# Patient Record
Sex: MALE | Race: WHITE | Employment: UNEMPLOYED | URBAN - METROPOLITAN AREA
[De-identification: names, ages, dates, MRNs, and addresses within clinical notes are randomized per-mention and may not be internally consistent; named-entity substitution may affect disease eponyms.]

---

## 2010-01-29 LAB — COLONOSCOPY, EXTERNAL: NORMAL

## 2011-04-01 LAB — COLONOSCOPY, EXTERNAL: NORMAL

## 2017-03-15 RX ORDER — LISINOPRIL 10 MG/1
TABLET ORAL
Qty: 30 TAB | Refills: 0 | Status: SHIPPED | OUTPATIENT
Start: 2017-03-15 | End: 2017-05-12 | Stop reason: SDUPTHER

## 2017-03-15 RX ORDER — AMLODIPINE BESYLATE 10 MG/1
TABLET ORAL
Qty: 30 TAB | Refills: 0 | Status: SHIPPED | OUTPATIENT
Start: 2017-03-15 | End: 2017-05-12 | Stop reason: SDUPTHER

## 2017-03-15 RX ORDER — SITAGLIPTIN AND METFORMIN HYDROCHLORIDE 50; 1000 MG/1; MG/1
TABLET, FILM COATED, EXTENDED RELEASE ORAL
Qty: 60 TAB | Refills: 0 | Status: SHIPPED | OUTPATIENT
Start: 2017-03-15 | End: 2017-05-12 | Stop reason: SDUPTHER

## 2017-03-15 RX ORDER — ATORVASTATIN CALCIUM 40 MG/1
TABLET, FILM COATED ORAL
Qty: 30 TAB | Refills: 0 | Status: SHIPPED | OUTPATIENT
Start: 2017-03-15 | End: 2017-05-12 | Stop reason: SDUPTHER

## 2017-03-15 RX ORDER — LEVETIRACETAM 500 MG/1
TABLET ORAL
Qty: 30 TAB | Refills: 0 | Status: SHIPPED | OUTPATIENT
Start: 2017-03-15 | End: 2017-05-12 | Stop reason: SDUPTHER

## 2017-05-12 RX ORDER — SITAGLIPTIN AND METFORMIN HYDROCHLORIDE 50; 1000 MG/1; MG/1
TABLET, FILM COATED, EXTENDED RELEASE ORAL
Qty: 60 TAB | Refills: 1 | Status: SHIPPED | OUTPATIENT
Start: 2017-05-12 | End: 2017-08-09 | Stop reason: SDUPTHER

## 2017-05-12 RX ORDER — ATORVASTATIN CALCIUM 40 MG/1
TABLET, FILM COATED ORAL
Qty: 30 TAB | Refills: 1 | Status: SHIPPED | OUTPATIENT
Start: 2017-05-12 | End: 2017-08-09 | Stop reason: SDUPTHER

## 2017-05-12 RX ORDER — AMLODIPINE BESYLATE 10 MG/1
TABLET ORAL
Qty: 30 TAB | Refills: 1 | Status: SHIPPED | OUTPATIENT
Start: 2017-05-12 | End: 2017-08-09 | Stop reason: SDUPTHER

## 2017-05-12 RX ORDER — LEVETIRACETAM 500 MG/1
TABLET ORAL
Qty: 30 TAB | Refills: 1 | Status: SHIPPED | OUTPATIENT
Start: 2017-05-12 | End: 2017-08-09 | Stop reason: SDUPTHER

## 2017-05-12 RX ORDER — LISINOPRIL 10 MG/1
TABLET ORAL
Qty: 30 TAB | Refills: 1 | Status: SHIPPED | OUTPATIENT
Start: 2017-05-12 | End: 2017-08-09 | Stop reason: SDUPTHER

## 2017-05-12 NOTE — TELEPHONE ENCOUNTER
Pharmacy sent in refill request. Called the pt to inform him that per the physician \"Needs office visit on file and then can get 1 month refill. \"  No answer, left message requesting return call.   Requested Prescriptions     Pending Prescriptions Disp Refills    lisinopril (PRINIVIL, ZESTRIL) 10 mg tablet [Pharmacy Med Name: LISINOPRIL 10 MG TABLET] 30 Tab 0     Sig: take 1 tablet by mouth once daily    atorvastatin (LIPITOR) 40 mg tablet [Pharmacy Med Name: ATORVASTATIN 40 MG TABLET] 30 Tab 0     Sig: take 1 tablet by mouth once daily    amLODIPine (NORVASC) 10 mg tablet [Pharmacy Med Name: AMLODIPINE BESYLATE 10 MG TAB] 30 Tab 0     Sig: take 1 tablet by mouth once daily    JANUMET XR 50-1,000 mg TM24 [Pharmacy Med Name: JANUMET XR 50-1,000 MG TABLET] 60 Tab 0     Sig: take 2 tablet by mouth once daily WITH DINNER    levETIRAcetam (KEPPRA) 500 mg tablet [Pharmacy Med Name: LEVETIRACETAM 500 MG TABLET] 30 Tab 0     Sig: take 1 tablet by mouth twice a day

## 2017-05-12 NOTE — TELEPHONE ENCOUNTER
Patient returned my call. Stated that he is in [place] for work and will be there for another 2 months, ending July 28th. Stated that he is typically gone 10 weeks at a time. He is scheduled to come into the office to see pcp on august 7th.

## 2017-06-29 RX ORDER — ALBUTEROL SULFATE 90 UG/1
AEROSOL, METERED RESPIRATORY (INHALATION)
Qty: 8.5 INHALER | Refills: 5 | Status: SHIPPED | OUTPATIENT
Start: 2017-06-29 | End: 2018-11-13 | Stop reason: SDUPTHER

## 2017-08-22 ENCOUNTER — HOSPITAL ENCOUNTER (OUTPATIENT)
Dept: LAB | Age: 52
Discharge: HOME | End: 2017-08-22
Payer: COMMERCIAL

## 2017-08-22 LAB
ALBUMIN SERPL-MCNC: 3.4 G/DL (ref 3.4–5)
ALBUMIN/GLOB SERPL: 1.3 {RATIO} (ref 0.8–1.7)
ALP SERPL-CCNC: 79 U/L (ref 45–117)
ALT SERPL-CCNC: 37 U/L (ref 16–61)
ANION GAP SERPL CALC-SCNC: 9 MMOL/L (ref 3–18)
AST SERPL-CCNC: 14 U/L (ref 15–37)
BILIRUB SERPL-MCNC: 0.2 MG/DL (ref 0.2–1)
BUN SERPL-MCNC: 14 MG/DL (ref 7–18)
BUN/CREAT SERPL: 19 (ref 12–20)
CALCIUM SERPL-MCNC: 8.4 MG/DL (ref 8.5–10.1)
CHLORIDE SERPL-SCNC: 101 MMOL/L (ref 100–108)
CHOLEST SERPL-MCNC: 154 MG/DL
CO2 SERPL-SCNC: 27 MMOL/L (ref 21–32)
CREAT SERPL-MCNC: 0.73 MG/DL (ref 0.6–1.3)
GLOBULIN SER CALC-MCNC: 2.6 G/DL (ref 2–4)
GLUCOSE SERPL-MCNC: 186 MG/DL (ref 74–99)
HDLC SERPL-MCNC: 34 MG/DL (ref 40–60)
HDLC SERPL: 4.5 {RATIO} (ref 0–5)
LDLC SERPL CALC-MCNC: 80 MG/DL (ref 0–100)
LIPID PROFILE,FLP: ABNORMAL
POTASSIUM SERPL-SCNC: 4.4 MMOL/L (ref 3.5–5.5)
PROT SERPL-MCNC: 6 G/DL (ref 6.4–8.2)
SODIUM SERPL-SCNC: 137 MMOL/L (ref 136–145)
TRIGL SERPL-MCNC: 200 MG/DL (ref ?–150)
VLDLC SERPL CALC-MCNC: 40 MG/DL

## 2017-08-22 PROCEDURE — 80061 LIPID PANEL: CPT | Performed by: INTERNAL MEDICINE

## 2017-08-22 PROCEDURE — 80053 COMPREHEN METABOLIC PANEL: CPT | Performed by: INTERNAL MEDICINE

## 2017-08-22 PROCEDURE — 82043 UR ALBUMIN QUANTITATIVE: CPT | Performed by: INTERNAL MEDICINE

## 2017-08-22 PROCEDURE — 36415 COLL VENOUS BLD VENIPUNCTURE: CPT | Performed by: INTERNAL MEDICINE

## 2017-08-22 PROCEDURE — 82306 VITAMIN D 25 HYDROXY: CPT | Performed by: INTERNAL MEDICINE

## 2017-08-23 LAB
25(OH)D3 SERPL-MCNC: 11.3 NG/ML (ref 30–100)
CREAT UR-MCNC: 192.04 MG/DL (ref 30–125)
MICROALBUMIN UR-MCNC: 344.4 MG/DL (ref 0–3)
MICROALBUMIN/CREAT UR-RTO: 1793 MG/G (ref 0–30)

## 2017-08-30 ENCOUNTER — OFFICE VISIT (OUTPATIENT)
Dept: INTERNAL MEDICINE CLINIC | Age: 52
End: 2017-08-30

## 2017-08-30 VITALS
SYSTOLIC BLOOD PRESSURE: 138 MMHG | DIASTOLIC BLOOD PRESSURE: 69 MMHG | HEART RATE: 100 BPM | BODY MASS INDEX: 41.14 KG/M2 | WEIGHT: 287.4 LBS | RESPIRATION RATE: 20 BRPM | OXYGEN SATURATION: 100 % | HEIGHT: 70 IN | TEMPERATURE: 98.1 F

## 2017-08-30 DIAGNOSIS — Z12.5 SCREENING PSA (PROSTATE SPECIFIC ANTIGEN): ICD-10-CM

## 2017-08-30 DIAGNOSIS — E66.01 MORBID OBESITY DUE TO EXCESS CALORIES (HCC): ICD-10-CM

## 2017-08-30 DIAGNOSIS — G47.33 OBSTRUCTIVE SLEEP APNEA SYNDROME: ICD-10-CM

## 2017-08-30 DIAGNOSIS — E11.29 CONTROLLED TYPE 2 DIABETES MELLITUS WITH MICROALBUMINURIA, WITHOUT LONG-TERM CURRENT USE OF INSULIN (HCC): Primary | ICD-10-CM

## 2017-08-30 DIAGNOSIS — I10 ESSENTIAL HYPERTENSION: ICD-10-CM

## 2017-08-30 DIAGNOSIS — R56.9 SEIZURE (HCC): ICD-10-CM

## 2017-08-30 DIAGNOSIS — R80.9 CONTROLLED TYPE 2 DIABETES MELLITUS WITH MICROALBUMINURIA, WITHOUT LONG-TERM CURRENT USE OF INSULIN (HCC): Primary | ICD-10-CM

## 2017-08-30 DIAGNOSIS — E78.00 HIGH CHOLESTEROL: ICD-10-CM

## 2017-08-30 DIAGNOSIS — E55.9 VITAMIN D DEFICIENCY: ICD-10-CM

## 2017-08-30 LAB — HBA1C MFR BLD HPLC: 9.8 %

## 2017-08-30 RX ORDER — LEVETIRACETAM 500 MG/1
TABLET ORAL
Qty: 60 TAB | Refills: 5 | Status: SHIPPED | OUTPATIENT
Start: 2017-08-30 | End: 2018-03-08 | Stop reason: SDUPTHER

## 2017-08-30 RX ORDER — ATORVASTATIN CALCIUM 40 MG/1
TABLET, FILM COATED ORAL
Qty: 30 TAB | Refills: 0 | Status: SHIPPED | OUTPATIENT
Start: 2017-08-30 | End: 2017-10-16 | Stop reason: SDUPTHER

## 2017-08-30 RX ORDER — AMLODIPINE BESYLATE 10 MG/1
TABLET ORAL
Qty: 30 TAB | Refills: 0 | Status: SHIPPED | OUTPATIENT
Start: 2017-08-30 | End: 2017-10-16 | Stop reason: SDUPTHER

## 2017-08-30 RX ORDER — LISINOPRIL 20 MG/1
20 TABLET ORAL DAILY
Qty: 30 TAB | Refills: 3 | Status: SHIPPED | OUTPATIENT
Start: 2017-08-30 | End: 2017-10-16 | Stop reason: SDUPTHER

## 2017-08-30 NOTE — PROGRESS NOTES
Gerhardt Roger is a  45 y.o. male presents today for office visit for routine follow up. 1. Have you been to the ER, urgent care clinic or hospitalized since your last visit? NO    2. Have you seen or consulted any other health care providers outside of the [facility] since your last visit (Include any pap smears or colon screening)?  NO

## 2017-08-30 NOTE — PROGRESS NOTES
Subjective:       Chief Complaint  The patient presents for follow up of diabetes, hypertension and high cholesterol. HPI  Socorro  is a 45 y.o. male seen for follow up of diabetes. Diandra has hypertension and hyperlipidemia. Diabetes poorly controlled, on Janumet, pt has gained weight with being out of town for the last few months in a Hotel,, hypertension well controlled, on Norvasc and lisinopril which is also for his proteinuria, hyperlipidemia well controlled, on Lipitor. Diet and Lifestyle: generally follows a low fat low cholesterol diet, does not rigorously follow a diabetic diet, sedentary    Home BP Monitoring: is not measured at home. Diabetic Review of Systems - home glucose monitoring: is performed regularly, 1x/day . Other symptoms and concerns: pt continues on Keppra for possible stroke/ seizure he had. Pt continues on OTC supplementation for his vit D deficiency. Pt continue to smoke tobacco     Pt has had increased ED with his weight gain. Discussed the patient's BMI with him. The BMI follow up plan is as follows: BMI is out of normal parameters and plan is as follows: I have counseled this patient on diet and exercise regimens. Current Outpatient Prescriptions   Medication Sig    lisinopril (PRINIVIL, ZESTRIL) 20 mg tablet Take 1 Tab by mouth daily.  atorvastatin (LIPITOR) 40 mg tablet take 1 tablet by mouth once daily    amLODIPine (NORVASC) 10 mg tablet take 1 tablet by mouth once daily    levETIRAcetam (KEPPRA) 500 mg tablet take 1 tablet by mouth twice a day    SITagliptin-metFORMIN (JANUMET XR) 50-1,000 mg TM24 take 2 tablets by mouth once daily with DINNER    dulaglutide (TRULICITY) 6.90 XT/8.1 mL sub-q pen 0.5 mL by SubCUTAneous route every seven (7) days.  PROAIR HFA 90 mcg/actuation inhaler inhale 2 puffs by mouth every 4 hours AS NEEDED for wheezing    aspirin (ASPIRIN) 325 mg tablet Take 162.5 mg by mouth daily.     tadalafil (CIALIS) 5 mg tablet Take 1 Tab by mouth as needed.  phentermine (ADIPEX-P) 37.5 mg tablet Take 1 Tab by mouth every morning. Max Daily Amount: 37.5 mg.    acetaminophen (TYLENOL) 325 mg tablet Take 325 mg by mouth every four (4) hours as needed for Pain. No current facility-administered medications for this visit.               Review of Systems  Respiratory: negative for dyspnea on exertion  Cardiovascular: negative for chest pain    Objective:     Visit Vitals    /69 (BP 1 Location: Left arm, BP Patient Position: Sitting)    Pulse 100    Temp 98.1 °F (36.7 °C) (Oral)    Resp 20    Ht 5' 10\" (1.778 m)    Wt 287 lb 6.4 oz (130.4 kg)    SpO2 100%    BMI 41.24 kg/m2        General appearance - alert, well appearing, and in no distress  Chest - clear to auscultation, no wheezes, rales or rhonchi, symmetric air entry  Heart - normal rate, regular rhythm, normal S1, S2, no murmurs, rubs, clicks or gallops  Extremities - peripheral pulses normal, no pedal edema, no clubbing or cyanosis      Labs:   Lab Results   Component Value Date/Time    WBC 17.1 03/30/2015 02:03 PM    HGB 17.1 03/30/2015 02:03 PM    HCT 50.8 03/30/2015 02:03 PM    PLATELET 205 37/38/6956 02:03 PM    MCV 93 03/30/2015 02:03 PM     Lab Results   Component Value Date/Time    Hemoglobin A1c 8.9 06/21/2016 08:43 AM    Hemoglobin A1c 5.9 09/22/2015 08:11 AM    Hemoglobin A1c 7.2 06/17/2015 08:44 AM    Glucose 186 08/22/2017 08:08 AM    Microalbumin/Creat ratio (mg/g creat) 1793 08/22/2017 08:08 AM    Microalbumin,urine random 344.40 08/22/2017 08:08 AM    LDL, calculated 80 08/22/2017 08:08 AM    Creatinine 0.73 08/22/2017 08:08 AM      Lab Results   Component Value Date/Time    Cholesterol, total 154 08/22/2017 08:08 AM    HDL Cholesterol 34 08/22/2017 08:08 AM    LDL, calculated 80 08/22/2017 08:08 AM    Triglyceride 200 08/22/2017 08:08 AM    CHOL/HDL Ratio 4.5 08/22/2017 08:08 AM     Lab Results   Component Value Date/Time    ALT (SGPT) 37 08/22/2017 08:08 AM    AST (SGOT) 14 08/22/2017 08:08 AM    Alk. phosphatase 79 08/22/2017 08:08 AM    Bilirubin, total 0.2 08/22/2017 08:08 AM     Lab Results   Component Value Date/Time    GFR est AA >60 08/22/2017 08:08 AM    GFR est non-AA >60 08/22/2017 08:08 AM    Creatinine 0.73 08/22/2017 08:08 AM    BUN 14 08/22/2017 08:08 AM    Sodium 137 08/22/2017 08:08 AM    Potassium 4.4 08/22/2017 08:08 AM    Chloride 101 08/22/2017 08:08 AM    CO2 27 08/22/2017 08:08 AM      Lab Results   Component Value Date/Time    Glucose 186 08/22/2017 08:08 AM      Labs:   Lab Results   Component Value Date/Time    Hemoglobin A1c 8.9 06/21/2016 08:43 AM    Hemoglobin A1c (POC) 9.8 08/30/2017 05:55 PM            Assessment / Plan     Diabetes poorly controlled. Will continue  2  Janumet XR 50/1000 every day and try to lose weight and add Trulicity   Hypertension well controlled, on current meds. april increase lisinopril to 20 mg due to proteinuria. Pt to monitor for dizziness   Hyperlipidemia borderline controlled, on Lipitor 40 mg. Will try and improve with diet and weight loss. ICD-10-CM ICD-9-CM    1. Controlled type 2 diabetes mellitus with microalbuminuria, without long-term current use of insulin (HCC) E11.29 250.40 AMB POC HEMOGLOBIN A1C    R80.9 791.0 HEMOGLOBIN A1C W/O EAG   2. Essential hypertension C36 254.6 METABOLIC PANEL, COMPREHENSIVE   3. High cholesterol E78.00 272.0 LIPID PANEL   4. Vitamin D deficiency E55.9 268.9 Uncontrolled off meds. Will start vit D 2000 units/day    5. Seizure ([address] R56.9 780.39 Well controlled on levETIRAcetam (KEPPRA) 500 mg tablet   6. Obstructive sleep apnea syndrome G47.33 327.23 REFERRAL TO NEUROLOGY   7. Morbid obesity due to excess calories (HCC) E66.01 278.01 Will continue to work on diet exercise to lose weight.     8. Screening PSA (prostate specific antigen) Z12.5 V76.44 PSA SCREENING (SCREENING)              Medication instructions and potential side effects reviewed with patient in detail. Diabetic issues reviewed with him: diabetic diet discussed in detail, and low cholesterol diet, weight control and daily exercise discussed. Follow-up Disposition:  Return in about 3 months (around 11/30/2017) for labs 1 week before. Reviewed plan of care. Patient has provided input and agrees with goals.

## 2017-08-30 NOTE — PATIENT INSTRUCTIONS

## 2017-08-30 NOTE — MR AVS SNAPSHOT
Visit Information Date & Time Provider Department Dept. Phone Encounter #  
 8/30/2017  4:45 PM Holly Zambrano MD [facility] [phone] Follow-up Instructions Return in about 3 months (around 11/30/2017) for labs 1 week before. Upcoming Health Maintenance Date Due  
 FOOT EXAM Q1 7/22/1973 EYE EXAM RETINAL OR DILATED Q1 7/22/1973 Pneumococcal 19-64 Medium Risk (1 of 1 - PPSV23) 7/22/1982 DTaP/Tdap/Td series (1 - Tdap) 7/22/1984 FOBT Q 1 YEAR AGE 50-75 7/22/2013 HEMOGLOBIN A1C Q6M 12/21/2016 INFLUENZA AGE 9 TO ADULT 8/1/2017 MICROALBUMIN Q1 8/22/2018 LIPID PANEL Q1 8/22/2018 Allergies as of 8/30/2017  Review Complete On: 8/30/2017 By: Holly Zambrano MD  
 No Known Allergies Current Immunizations  Reviewed on 9/29/2015 Name Date Influenza Vaccine (Quad) PF 9/29/2015 Not reviewed this visit You Were Diagnosed With   
  
 Codes Comments Controlled type 2 diabetes mellitus with microalbuminuria, without long-term current use of insulin (HCC)    -  Primary ICD-10-CM: E11.29, R80.9 ICD-9-CM: 250.40, 791.0 Seizure ([address]     ICD-10-CM: R56.9 ICD-9-CM: 780.39 Essential hypertension     ICD-10-CM: I10 
ICD-9-CM: 401.9 High cholesterol     ICD-10-CM: E78.00 ICD-9-CM: 272.0 Vitamin D deficiency     ICD-10-CM: E55.9 ICD-9-CM: 268.9 Obstructive sleep apnea syndrome     ICD-10-CM: G47.33 
ICD-9-CM: 327.23 Vitals BP Pulse Temp Resp Height(growth percentile) Weight(growth percentile)  
 138/69 (BP 1 Location: Left arm, BP Patient Position: Sitting) 100 98.1 °F (36.7 °C) (Oral) 20 5' 10\" (1.778 m) 287 lb 6.4 oz (130.4 kg) SpO2 BMI Smoking Status 100% 41.24 kg/m2 Current Some Day Smoker Vitals History BMI and BSA Data Body Mass Index Body Surface Area  
 41.24 kg/m 2 2.54 m 2 Preferred Pharmacy Pharmacy Name Phone [facility] [address] [facility], [address] [facility] [phone] Your Updated Medication List  
  
   
This list is accurate as of: 8/30/17  5:52 PM.  Always use your most recent med list.  
  
  
  
  
 acetaminophen 325 mg tablet Commonly known as:  TYLENOL Take 325 mg by mouth every four (4) hours as needed for Pain. amLODIPine 10 mg tablet Commonly known as:  NORVASC  
take 1 tablet by mouth once daily  
  
 aspirin 325 mg tablet Commonly known as:  ASPIRIN Take 162.5 mg by mouth daily. atorvastatin 40 mg tablet Commonly known as:  LIPITOR  
take 1 tablet by mouth once daily  
  
 dulaglutide 0.75 mg/0.5 mL sub-q pen Commonly known as:  TRULICITY  
0.5 mL by SubCUTAneous route every seven (7) days. levETIRAcetam 500 mg tablet Commonly known as:  KEPPRA  
take 1 tablet by mouth twice a day  
  
 lisinopril 20 mg tablet Commonly known as:  Arie Rell Take 1 Tab by mouth daily. phentermine 37.5 mg tablet Commonly known as:  ADIPEX-P Take 1 Tab by mouth every morning. Max Daily Amount: 37.5 mg. PROAIR HFA 90 mcg/actuation inhaler Generic drug:  albuterol  
inhale 2 puffs by mouth every 4 hours AS NEEDED for wheezing SITagliptin-metFORMIN 50-1,000 mg Tm24 Commonly known as:  JANUMET XR  
take 2 tablets by mouth once daily with DINNER  
  
 tadalafil 5 mg tablet Commonly known as:  CIALIS Take 1 Tab by mouth as needed. Prescriptions Sent to Pharmacy Refills  
 lisinopril (PRINIVIL, ZESTRIL) 20 mg tablet 3 Sig: Take 1 Tab by mouth daily. Class: Normal  
 Pharmacy: [facility] [id number] [address] Ph #: [phone] Route: Oral  
 atorvastatin (LIPITOR) 40 mg tablet 0 Sig: take 1 tablet by mouth once daily Class: Normal  
 Pharmacy: [facility] [id number] [address] Ph #: [phone] amLODIPine (NORVASC) 10 mg tablet 0 Sig: take 1 tablet by mouth once daily Class: Normal  
 Pharmacy: [facility] [address] Ph #: [phone]  
 levETIRAcetam (KEPPRA) 500 mg tablet 5 Sig: take 1 tablet by mouth twice a day Class: Normal  
 Pharmacy: [id number] [address] [facility], [facility] Ph #: [phone] SITagliptin-metFORMIN (JANUMET XR) 50-1,000 mg TM24 5 Sig: take 2 tablets by mouth once daily with DINNER Class: Normal  
 Pharmacy: [facility] [address] Ph #: [phone]  
 dulaglutide (TRULICITY) 3.46 XT/4.9 mL sub-q pen 5 Si.5 mL by SubCUTAneous route every seven (7) days. Class: Normal  
 Pharmacy: [id number] [address], [address] [facility] Ph #: [phone] Route: SubCUTAneous We Performed the Following REFERRAL TO NEUROLOGY [PNT22 Custom] Comments:  
 Refer to Dr Edwyna Cowden for sleep apnea Follow-up Instructions Return in about 3 months (around 2017) for labs 1 week before. Referral Information Referral ID Referred By Referred To  
  
 [id number] ILAN FISHER MD   
   [address], [address] [place] [address] Road Phone: [phone] Fax: [phone] Visits Status Start Date End Date 1 New Request 17 If your referral has a status of pending review or denied, additional information will be sent to support the outcome of this decision. Patient Instructions DASH Diet: Care Instructions Your Care Instructions The DASH diet is an eating plan that can help lower your blood pressure. DASH stands for Dietary Approaches to Stop Hypertension. Hypertension is high blood pressure.  
The DASH diet focuses on eating foods that are high in calcium, potassium, and magnesium. These nutrients can lower blood pressure. The foods that are highest in these nutrients are fruits, vegetables, low-fat dairy products, nuts, seeds, and legumes. But taking calcium, potassium, and magnesium supplements instead of eating foods that are high in those nutrients does not have the same effect. The DASH diet also includes whole grains, fish, and poultry. The DASH diet is one of several lifestyle changes your doctor may recommend to lower your high blood pressure. Your doctor may also want you to decrease the amount of sodium in your diet. Lowering sodium while following the DASH diet can lower blood pressure even further than just the DASH diet alone. Follow-up care is a key part of your treatment and safety. Be sure to make and go to all appointments, and call your doctor if you are having problems. It's also a good idea to know your test results and keep a list of the medicines you take. How can you care for yourself at home? Following the DASH diet · Eat 4 to 5 servings of fruit each day. A serving is 1 medium-sized piece of fruit, ½ cup chopped or canned fruit, 1/4 cup dried fruit, or 4 ounces (½ cup) of fruit juice. Choose fruit more often than fruit juice. · Eat 4 to 5 servings of vegetables each day. A serving is 1 cup of lettuce or raw leafy vegetables, ½ cup of chopped or cooked vegetables, or 4 ounces (½ cup) of vegetable juice. Choose vegetables more often than vegetable juice. · Get 2 to 3 servings of low-fat and fat-free dairy each day. A serving is 8 ounces of milk, 1 cup of yogurt, or 1 ½ ounces of cheese. · Eat 6 to 8 servings of grains each day. A serving is 1 slice of bread, 1 ounce of dry cereal, or ½ cup of cooked rice, pasta, or cooked cereal. Try to choose whole-grain products as much as possible. · Limit lean meat, poultry, and fish to 2 servings each day. A serving is 3 ounces, about the size of a deck of cards. · Eat 4 to 5 servings of nuts, seeds, and legumes (cooked dried beans, lentils, and split peas) each week. A serving is 1/3 cup of nuts, 2 tablespoons of seeds, or ½ cup of cooked beans or peas. · Limit fats and oils to 2 to 3 servings each day. A serving is 1 teaspoon of vegetable oil or 2 tablespoons of salad dressing. · Limit sweets and added sugars to 5 servings or less a week. A serving is 1 tablespoon jelly or jam, ½ cup sorbet, or 1 cup of lemonade. · Eat less than 2,300 milligrams (mg) of sodium a day. If you limit your sodium to 1,500 mg a day, you can lower your blood pressure even more. Tips for success · Start small. Do not try to make dramatic changes to your diet all at once. You might feel that you are missing out on your favorite foods and then be more likely to not follow the plan. Make small changes, and stick with them. Once those changes become habit, add a few more changes. · Try some of the following: ¨ Make it a goal to eat a fruit or vegetable at every meal and at snacks. This will make it easy to get the recommended amount of fruits and vegetables each day. ¨ Try yogurt topped with fruit and nuts for a snack or healthy dessert. ¨ Add lettuce, tomato, cucumber, and onion to sandwiches. ¨ Combine a ready-made pizza crust with low-fat mozzarella cheese and lots of vegetable toppings. Try using tomatoes, squash, spinach, broccoli, carrots, cauliflower, and onions. ¨ Have a variety of cut-up vegetables with a low-fat dip as an appetizer instead of chips and dip. ¨ Sprinkle sunflower seeds or chopped almonds over salads. Or try adding chopped walnuts or almonds to cooked vegetables. ¨ Try some vegetarian meals using beans and peas. Add garbanzo or kidney beans to salads. Make burritos and tacos with mashed huang beans or black beans. Where can you learn more? Go to http://enoc-gary.info/.  
Enter X095 in the search box to learn more about \"DASH Diet: Care Instructions. \" Current as of: April 3, 2017 Content Version: 11.3 © 7778-8314 [employer]. Care instructions adapted under license by [employer] (which disclaims liability or warranty for this information). If you have questions about a medical condition or this instruction, always ask your healthcare professional. [address] any warranty or liability for your use of this information. Introducing [facility]! Shorty Chu introduces [employer] patient portal. Now you can access parts of your medical record, email your doctor's office, and request medication refills online. 1. In your internet browser, go to [email or web address] 2. Click on the First Time User? Click Here link in the Sign In box. You will see the New Member Sign Up page. 3. Enter your [employer] Access Code exactly as it appears below. You will not need to use this code after youve completed the sign-up process. If you do not sign up before the expiration date, you must request a new code. · [employer] Access Code: [email or web address] Expires: 11/28/2017  5:52 PM 
 
4. Enter the last four digits of your Social Security Number (xxxx) and Date of Birth (mm/dd/yyyy) as indicated and click Submit. You will be taken to the next sign-up page. 5. Create a T[employer]Club ID. This will be your [employer]Club login ID and cannot be changed, so think of one that is secure and easy to remember. 6. Create a T[employer]Club password. You can change your password at any time. 7. Enter your Password Reset Question and Answer. This can be used at a later time if you forget your password. 8. Enter your e-mail address. You will receive e-mail notification when new information is available in [address] 9. Click Sign Up. You can now view and download portions of your medical record. 10. Click the Download Summary menu link to download a portable copy of your medical information. If you have questions, please visit the Frequently Asked Questions section of the [facility] website. Remember, [employer] is NOT to be used for urgent needs. For medical emergencies, dial 911. Now available from your iPhone and Android! Please provide this summary of care documentation to your next provider. Your primary care clinician is listed as ILAN FISHER. If you have any questions after today's visit, please call [phone].

## 2018-01-17 ENCOUNTER — HOSPITAL ENCOUNTER (OUTPATIENT)
Dept: LAB | Age: 53
Discharge: HOME | End: 2018-01-17
Payer: COMMERCIAL

## 2018-01-17 DIAGNOSIS — Z12.5 SCREENING PSA (PROSTATE SPECIFIC ANTIGEN): ICD-10-CM

## 2018-01-17 DIAGNOSIS — E55.9 VITAMIN D DEFICIENCY: ICD-10-CM

## 2018-01-17 DIAGNOSIS — I10 ESSENTIAL HYPERTENSION: ICD-10-CM

## 2018-01-17 DIAGNOSIS — R80.9 CONTROLLED TYPE 2 DIABETES MELLITUS WITH MICROALBUMINURIA, WITHOUT LONG-TERM CURRENT USE OF INSULIN (HCC): ICD-10-CM

## 2018-01-17 DIAGNOSIS — E11.29 CONTROLLED TYPE 2 DIABETES MELLITUS WITH MICROALBUMINURIA, WITHOUT LONG-TERM CURRENT USE OF INSULIN (HCC): ICD-10-CM

## 2018-01-17 DIAGNOSIS — E78.00 HIGH CHOLESTEROL: ICD-10-CM

## 2018-01-17 LAB
25(OH)D3 SERPL-MCNC: 17.8 NG/ML (ref 30–100)
ALBUMIN SERPL-MCNC: 3.4 G/DL (ref 3.4–5)
ALBUMIN/GLOB SERPL: 1.1 {RATIO} (ref 0.8–1.7)
ALP SERPL-CCNC: 82 U/L (ref 45–117)
ALT SERPL-CCNC: 46 U/L (ref 16–61)
ANION GAP SERPL CALC-SCNC: 10 MMOL/L (ref 3–18)
AST SERPL-CCNC: 21 U/L (ref 15–37)
BILIRUB SERPL-MCNC: 0.3 MG/DL (ref 0.2–1)
BUN SERPL-MCNC: 20 MG/DL (ref 7–18)
BUN/CREAT SERPL: 29 (ref 12–20)
CALCIUM SERPL-MCNC: 8.2 MG/DL (ref 8.5–10.1)
CHLORIDE SERPL-SCNC: 104 MMOL/L (ref 100–108)
CHOLEST SERPL-MCNC: 133 MG/DL
CO2 SERPL-SCNC: 23 MMOL/L (ref 21–32)
CREAT SERPL-MCNC: 0.7 MG/DL (ref 0.6–1.3)
GLOBULIN SER CALC-MCNC: 3.2 G/DL (ref 2–4)
GLUCOSE SERPL-MCNC: 126 MG/DL (ref 74–99)
HBA1C MFR BLD: 8.9 % (ref 4.2–5.6)
HDLC SERPL-MCNC: 27 MG/DL (ref 40–60)
HDLC SERPL: 4.9 {RATIO} (ref 0–5)
LDLC SERPL CALC-MCNC: 51.2 MG/DL (ref 0–100)
LIPID PROFILE,FLP: ABNORMAL
POTASSIUM SERPL-SCNC: 4.5 MMOL/L (ref 3.5–5.5)
PROT SERPL-MCNC: 6.6 G/DL (ref 6.4–8.2)
PSA SERPL-MCNC: 0.3 NG/ML (ref 0–4)
SODIUM SERPL-SCNC: 137 MMOL/L (ref 136–145)
TRIGL SERPL-MCNC: 274 MG/DL (ref ?–150)
VLDLC SERPL CALC-MCNC: 54.8 MG/DL

## 2018-01-17 PROCEDURE — 83036 HEMOGLOBIN GLYCOSYLATED A1C: CPT | Performed by: INTERNAL MEDICINE

## 2018-01-17 PROCEDURE — 80053 COMPREHEN METABOLIC PANEL: CPT | Performed by: INTERNAL MEDICINE

## 2018-01-17 PROCEDURE — 82306 VITAMIN D 25 HYDROXY: CPT | Performed by: INTERNAL MEDICINE

## 2018-01-17 PROCEDURE — 36415 COLL VENOUS BLD VENIPUNCTURE: CPT | Performed by: INTERNAL MEDICINE

## 2018-01-17 PROCEDURE — 80061 LIPID PANEL: CPT | Performed by: INTERNAL MEDICINE

## 2018-01-17 PROCEDURE — 84153 ASSAY OF PSA TOTAL: CPT | Performed by: INTERNAL MEDICINE

## 2018-01-29 ENCOUNTER — OFFICE VISIT (OUTPATIENT)
Dept: FAMILY MEDICINE CLINIC | Age: 53
End: 2018-01-29

## 2018-01-29 VITALS
SYSTOLIC BLOOD PRESSURE: 124 MMHG | DIASTOLIC BLOOD PRESSURE: 72 MMHG | TEMPERATURE: 98.4 F | RESPIRATION RATE: 20 BRPM | OXYGEN SATURATION: 96 % | HEART RATE: 97 BPM | WEIGHT: 279 LBS | BODY MASS INDEX: 39.94 KG/M2 | HEIGHT: 70 IN

## 2018-01-29 DIAGNOSIS — E66.01 OBESITY, MORBID (HCC): ICD-10-CM

## 2018-01-29 DIAGNOSIS — I10 ESSENTIAL HYPERTENSION: ICD-10-CM

## 2018-01-29 DIAGNOSIS — R53.82 CHRONIC FATIGUE: ICD-10-CM

## 2018-01-29 DIAGNOSIS — E78.00 HIGH CHOLESTEROL: ICD-10-CM

## 2018-01-29 DIAGNOSIS — E11.29 CONTROLLED TYPE 2 DIABETES MELLITUS WITH MICROALBUMINURIA, WITHOUT LONG-TERM CURRENT USE OF INSULIN (HCC): Primary | ICD-10-CM

## 2018-01-29 DIAGNOSIS — R80.9 CONTROLLED TYPE 2 DIABETES MELLITUS WITH MICROALBUMINURIA, WITHOUT LONG-TERM CURRENT USE OF INSULIN (HCC): Primary | ICD-10-CM

## 2018-01-29 DIAGNOSIS — E55.9 VITAMIN D DEFICIENCY: ICD-10-CM

## 2018-01-29 RX ORDER — ACETAMINOPHEN AND CODEINE PHOSPHATE 300; 30 MG/1; MG/1
TABLET ORAL
Refills: 0 | COMMUNITY
Start: 2018-01-19 | End: 2019-11-26 | Stop reason: ALTCHOICE

## 2018-01-29 NOTE — PATIENT INSTRUCTIONS
Learning About Diabetes Food Guidelines  Your Care Instructions    Meal planning is important to manage diabetes. It helps keep your blood sugar at a target level (which you set with your doctor). You don't have to eat special foods. You can eat what your family eats, including sweets once in a while. But you do have to pay attention to how often you eat and how much you eat of certain foods. You may want to work with a dietitian or a certified diabetes educator (CDE) to help you plan meals and snacks. A dietitian or CDE can also help you lose weight if that is one of your goals. What should you know about eating carbs? Managing the amount of carbohydrate (carbs) you eat is an important part of healthy meals when you have diabetes. Carbohydrate is found in many foods. · Learn which foods have carbs. And learn the amounts of carbs in different foods. ¨ Bread, cereal, pasta, and rice have about 15 grams of carbs in a serving. A serving is 1 slice of bread (1 ounce), ½ cup of cooked cereal, or 1/3 cup of cooked pasta or rice. ¨ Fruits have 15 grams of carbs in a serving. A serving is 1 small fresh fruit, such as an apple or orange; ½ of a banana; ½ cup of cooked or canned fruit; ½ cup of fruit juice; 1 cup of melon or raspberries; or 2 tablespoons of dried fruit. ¨ Milk and no-sugar-added yogurt have 15 grams of carbs in a serving. A serving is 1 cup of milk or 2/3 cup of no-sugar-added yogurt. ¨ Starchy vegetables have 15 grams of carbs in a serving. A serving is ½ cup of mashed potatoes or sweet potato; 1 cup winter squash; ½ of a small baked potato; ½ cup of cooked beans; or ½ cup cooked corn or green peas. · Learn how much carbs to eat each day and at each meal. A dietitian or CDE can teach you how to keep track of the amount of carbs you eat. This is called carbohydrate counting. · If you are not sure how to count carbohydrate grams, use the Plate Method to plan meals.  It is a good, quick way to make sure that you have a balanced meal. It also helps you spread carbs throughout the day. ¨ Divide your plate by types of foods. Put non-starchy vegetables on half the plate, meat or other protein food on one-quarter of the plate, and a grain or starchy vegetable in the final quarter of the plate. To this you can add a small piece of fruit and 1 cup of milk or yogurt, depending on how many carbs you are supposed to eat at a meal.  · Try to eat about the same amount of carbs at each meal. Do not \"save up\" your daily allowance of carbs to eat at one meal.  · Proteins have very little or no carbs per serving. Examples of proteins are beef, chicken, turkey, fish, eggs, tofu, cheese, cottage cheese, and peanut butter. A serving size of meat is 3 ounces, which is about the size of a deck of cards. Examples of meat substitute serving sizes (equal to 1 ounce of meat) are 1/4 cup of cottage cheese, 1 egg, 1 tablespoon of peanut butter, and ½ cup of tofu. How can you eat out and still eat healthy? · Learn to estimate the serving sizes of foods that have carbohydrate. If you measure food at home, it will be easier to estimate the amount in a serving of restaurant food. · If the meal you order has too much carbohydrate (such as potatoes, corn, or baked beans), ask to have a low-carbohydrate food instead. Ask for a salad or green vegetables. · If you use insulin, check your blood sugar before and after eating out to help you plan how much to eat in the future. · If you eat more carbohydrate at a meal than you had planned, take a walk or do other exercise. This will help lower your blood sugar. What else should you know? · Limit saturated fat, such as the fat from meat and dairy products. This is a healthy choice because people who have diabetes are at higher risk of heart disease. So choose lean cuts of meat and nonfat or low-fat dairy products.  Use olive or canola oil instead of butter or shortening when cooking. · Don't skip meals. Your blood sugar may drop too low if you skip meals and take insulin or certain medicines for diabetes. · Check with your doctor before you drink alcohol. Alcohol can cause your blood sugar to drop too low. Alcohol can also cause a bad reaction if you take certain diabetes medicines. Follow-up care is a key part of your treatment and safety. Be sure to make and go to all appointments, and call your doctor if you are having problems. It's also a good idea to know your test results and keep a list of the medicines you take. Where can you learn more? Go to http://enoc-gary.info/. Enter M842 in the search box to learn more about \"Learning About Diabetes Food Guidelines. \"  Current as of: March 13, 2017  Content Version: 11.4  © 9531-9488 [employer]. Care instructions adapted under license by [employer] (which disclaims liability or warranty for this information). If you have questions about a medical condition or this instruction, always ask your healthcare professional. [place] [address] any warranty or liability for your use of this information. Body Mass Index: Care Instructions  Your Care Instructions    Body mass index (BMI) can help you see if your weight is raising your risk for health problems. It uses a formula to compare how much you weigh with how tall you are. · A BMI lower than 18.5 is considered underweight. · A BMI between 18.5 and 24.9 is considered healthy. · A BMI between 25 and 29.9 is considered overweight. A BMI of 30 or higher is considered obese. If your BMI is in the normal range, it means that you have a lower risk for weight-related health problems. If your BMI is in the overweight or obese range, you may be at increased risk for weight-related health problems, such as high blood pressure, heart disease, stroke, arthritis or joint pain, and diabetes.  If your BMI is in the underweight range, you may be at increased risk for health problems such as fatigue, lower protection (immunity) against illness, muscle loss, bone loss, hair loss, and hormone problems. BMI is just one measure of your risk for weight-related health problems. You may be at higher risk for health problems if you are not active, you eat an unhealthy diet, or you drink too much alcohol or use tobacco products. Follow-up care is a key part of your treatment and safety. Be sure to make and go to all appointments, and call your doctor if you are having problems. It's also a good idea to know your test results and keep a list of the medicines you take. How can you care for yourself at home? · Practice healthy eating habits. This includes eating plenty of fruits, vegetables, whole grains, lean protein, and low-fat dairy. · If your doctor recommends it, get more exercise. Walking is a good choice. Bit by bit, increase the amount you walk every day. Try for at least 30 minutes on most days of the week. · Do not smoke. Smoking can increase your risk for health problems. If you need help quitting, talk to your doctor about stop-smoking programs and medicines. These can increase your chances of quitting for good. · Limit alcohol to 2 drinks a day for men and 1 drink a day for women. Too much alcohol can cause health problems. If you have a BMI higher than 25  · Your doctor may do other tests to check your risk for weight-related health problems. This may include measuring the distance around your waist. A waist measurement of more than 40 inches in men or 35 inches in women can increase the risk of weight-related health problems. · Talk with your doctor about steps you can take to stay healthy or improve your health. You may need to make lifestyle changes to lose weight and stay healthy, such as changing your diet and getting regular exercise.   If you have a BMI lower than 18.5  · Your doctor may do other tests to check your risk for health problems. · Talk with your doctor about steps you can take to stay healthy or improve your health. You may need to make lifestyle changes to gain or maintain weight and stay healthy, such as getting more healthy foods in your diet and doing exercises to build muscle. Where can you learn more? Go to http://enoc-gary.info/. Enter S176 in the search box to learn more about \"Body Mass Index: Care Instructions. \"  Current as of: October 13, 2016  Content Version: 11.4  © 0146-4289 [employer]. Care instructions adapted under license by [employer] (which disclaims liability or warranty for this information). If you have questions about a medical condition or this instruction, always ask your healthcare professional. [address] any warranty or liability for your use of this information.

## 2018-01-29 NOTE — PROGRESS NOTES
Patient is in the office today for a 3 month follow up. Patient states he is taking PCN for an abscessed tooth. 1. Have you been to the ER, urgent care clinic since your last visit? Hospitalized since your last visit? Yes, [facility] for tooth abscess. 2. Have you seen or consulted any other health care providers outside of the [address] [place] [address] since your last visit? Include any pap smears or colon screening.  No

## 2018-01-29 NOTE — PROGRESS NOTES
Subjective:       Chief Complaint  The patient presents for follow up of diabetes, hypertension and high cholesterol. RAMÍREZ Felder is a 45 y.o. male seen for follow up of diabetes. Healso has hypertension and hyperlipidemia. Diabetes poorly controlled, on Janumet, and Trulicity, pt continues to struggle to lose weight,, hypertension well controlled, on Norvasc and lisinopril which is also for his proteinuria, hyperlipidemia well controlled, on Lipitor. Diet and Lifestyle: generally follows a low fat low cholesterol diet, does not rigorously follow a diabetic diet, sedentary    Home BP Monitoring: is not measured at home. Diabetic Review of Systems - home glucose monitoring: is performed sporadically     Other symptoms and concerns: pt continues on Keppra for possible stroke/ seizure he had. Pt continues on OTC supplementation for his vit D deficiency. Pt continue to smoke tobacco     Pt has been told that he snores and he feels sleepy during the day. His neck size is 18. Pt is at high risk for sleep apnea so will set pt up for Home Sleep study to evaluate for sleep apnea. Discussed the patient's BMI with him. The BMI follow up plan is as follows: BMI is out of normal parameters and plan is as follows: I have counseled this patient on diet and exercise regimens. Current Outpatient Prescriptions   Medication Sig    dulaglutide (TRULICITY) 1.5 MV/1.0 mL sub-q pen 0.5 mL by SubCUTAneous route every seven (7) days.  atorvastatin (LIPITOR) 40 mg tablet take 1 tablet by mouth once daily    amLODIPine (NORVASC) 10 mg tablet take 1 tablet by mouth once daily    lisinopril (PRINIVIL, ZESTRIL) 20 mg tablet Take 1 Tab by mouth daily.  levETIRAcetam (KEPPRA) 500 mg tablet take 1 tablet by mouth twice a day    SITagliptin-metFORMIN (JANUMET XR) 50-1,000 mg TM24 take 2 tablets by mouth once daily with DINNER    aspirin (ASPIRIN) 325 mg tablet Take 162.5 mg by mouth daily.  acetaminophen-codeine (TYLENOL #3) 300-30 mg per tablet take 1 tablet by mouth every 4 hours if needed for pain    PROAIR HFA 90 mcg/actuation inhaler inhale 2 puffs by mouth every 4 hours AS NEEDED for wheezing    tadalafil (CIALIS) 5 mg tablet Take 1 Tab by mouth as needed.  phentermine (ADIPEX-P) 37.5 mg tablet Take 1 Tab by mouth every morning. Max Daily Amount: 37.5 mg.    acetaminophen (TYLENOL) 325 mg tablet Take 325 mg by mouth every four (4) hours as needed for Pain. No current facility-administered medications for this visit.               Review of Systems  Respiratory: negative for dyspnea on exertion  Cardiovascular: negative for chest pain    Objective:     Visit Vitals    /72 (BP 1 Location: Left arm, BP Patient Position: Sitting)    Pulse 97    Temp 98.4 °F (36.9 °C) (Oral)    Resp 20    Ht 5' 10\" (1.778 m)    Wt 279 lb (126.6 kg)    SpO2 96%    BMI 40.03 kg/m2        General appearance - alert, well appearing, and in no distress  Chest - clear to auscultation, no wheezes, rales or rhonchi, symmetric air entry  Heart - normal rate, regular rhythm, normal S1, S2, no murmurs, rubs, clicks or gallops  Extremities - peripheral pulses normal, no pedal edema, no clubbing or cyanosis      Labs:   Lab Results   Component Value Date/Time    WBC 17.1 03/30/2015 02:03 PM    HGB 17.1 03/30/2015 02:03 PM    HCT 50.8 03/30/2015 02:03 PM    PLATELET 499 23/98/9173 02:03 PM    MCV 93 03/30/2015 02:03 PM     Lab Results   Component Value Date/Time    Hemoglobin A1c 8.9 01/17/2018 07:28 AM    Hemoglobin A1c 8.9 06/21/2016 08:43 AM    Hemoglobin A1c 5.9 09/22/2015 08:11 AM    Glucose 126 01/17/2018 07:28 AM    Microalbumin/Creat ratio (mg/g creat) 1793 08/22/2017 08:08 AM    Microalbumin,urine random 344.40 08/22/2017 08:08 AM    LDL, calculated 51.2 01/17/2018 07:28 AM    Creatinine 0.70 01/17/2018 07:28 AM      Lab Results   Component Value Date/Time    Cholesterol, total 133 01/17/2018 07:28 AM    HDL Cholesterol 27 01/17/2018 07:28 AM    LDL, calculated 51.2 01/17/2018 07:28 AM    Triglyceride 274 01/17/2018 07:28 AM    CHOL/HDL Ratio 4.9 01/17/2018 07:28 AM     Lab Results   Component Value Date/Time    ALT (SGPT) 46 01/17/2018 07:28 AM    AST (SGOT) 21 01/17/2018 07:28 AM    Alk. phosphatase 82 01/17/2018 07:28 AM    Bilirubin, total 0.3 01/17/2018 07:28 AM     Lab Results   Component Value Date/Time    GFR est AA >60 01/17/2018 07:28 AM    GFR est non-AA >60 01/17/2018 07:28 AM    Creatinine 0.70 01/17/2018 07:28 AM    BUN 20 01/17/2018 07:28 AM    Sodium 137 01/17/2018 07:28 AM    Potassium 4.5 01/17/2018 07:28 AM    Chloride 104 01/17/2018 07:28 AM    CO2 23 01/17/2018 07:28 AM      Lab Results   Component Value Date/Time    Glucose 126 01/17/2018 07:28 AM      Labs:   Lab Results   Component Value Date/Time    Hemoglobin A1c 8.9 01/17/2018 07:28 AM    Hemoglobin A1c (POC) 9.8 08/30/2017 05:55 PM            Assessment / Plan     Diabetes poorly controlled. Will continue  2  Janumet XR 50/1000 and increase to maximal  Trulicity dose. Consider Jardiance next OV if Hba1c not improving. Hypertension well controlled, on current meds. Hyperlipidemia well controlled, on Lipitor 40 mg. ICD-10-CM ICD-9-CM    1. Controlled type 2 diabetes mellitus with microalbuminuria, without long-term current use of insulin (HCC) E11.29 250.40 HEMOGLOBIN A1C W/O EAG    R80.9 791.0 MICROALBUMIN, UR, RAND W/ MICROALBUMIN/CREA RATIO   2. Essential hypertension H32 907.2 METABOLIC PANEL, COMPREHENSIVE   3. High cholesterol E78.00 272.0 LIPID PANEL   4. Obesity, morbid ([address] E66.01 278.01 Pt will continue to work on diet and exercise to lose weight. 5. Vitamin D deficiency E55.9 268.9 Pt continues on current supplementation    6. Chronic fatigue R53.82 780.79 Will set up for Home Sleep study               Medication instructions and potential side effects reviewed with patient in detail.     Diabetic issues reviewed with him: diabetic diet discussed in detail, and low cholesterol diet, weight control and daily exercise discussed. Follow-up Disposition:  Return in about 4 months (around 5/29/2018) for labs 1 week before. Reviewed plan of care. Patient has provided input and agrees with goals. Discussed the patient's BMI with him. The BMI follow up plan is as follows:     dietary management education, guidance, and counseling  encourage exercise  monitor weight  prescribed dietary intake    An After Visit Summary was printed and given to the patient.

## 2018-02-04 RX ORDER — ATORVASTATIN CALCIUM 40 MG/1
TABLET, FILM COATED ORAL
Qty: 30 TAB | Refills: 3 | Status: SHIPPED | OUTPATIENT
Start: 2018-02-04 | End: 2018-06-01 | Stop reason: SDUPTHER

## 2018-02-04 RX ORDER — AMLODIPINE BESYLATE 10 MG/1
TABLET ORAL
Qty: 30 TAB | Refills: 3 | Status: SHIPPED | OUTPATIENT
Start: 2018-02-04 | End: 2018-06-01 | Stop reason: SDUPTHER

## 2018-02-04 RX ORDER — LISINOPRIL 20 MG/1
TABLET ORAL
Qty: 30 TAB | Refills: 3 | Status: SHIPPED | OUTPATIENT
Start: 2018-02-04 | End: 2018-06-01 | Stop reason: SDUPTHER

## 2018-03-03 ENCOUNTER — PATIENT MESSAGE (OUTPATIENT)
Dept: FAMILY MEDICINE CLINIC | Age: 53
End: 2018-03-03

## 2018-03-05 NOTE — TELEPHONE ENCOUNTER
----- Message from [place] sent at 3/3/2018  9:46 AM EST -----  Regarding: Prescription Question  Contact: [phone]  It's been awhile since I renewed my prescription for my glucose test strips. The pharmacist says it's no longer on file. Can you send another prescriptions for test strips?

## 2018-03-08 DIAGNOSIS — R56.9 SEIZURE (HCC): ICD-10-CM

## 2018-03-08 RX ORDER — LEVETIRACETAM 500 MG/1
TABLET ORAL
Qty: 60 TAB | Refills: 5 | Status: SHIPPED | OUTPATIENT
Start: 2018-03-08 | End: 2018-10-14 | Stop reason: SDUPTHER

## 2018-03-15 RX ORDER — SITAGLIPTIN AND METFORMIN HYDROCHLORIDE 50; 1000 MG/1; MG/1
TABLET, FILM COATED, EXTENDED RELEASE ORAL
Qty: 60 TAB | Refills: 5 | Status: SHIPPED | OUTPATIENT
Start: 2018-03-15 | End: 2018-10-21 | Stop reason: SDUPTHER

## 2018-03-19 ENCOUNTER — TELEPHONE (OUTPATIENT)
Dept: FAMILY MEDICINE CLINIC | Age: 53
End: 2018-03-19

## 2018-03-19 NOTE — TELEPHONE ENCOUNTER
Spoke with Mr. Billy Urrutia at [facility] he states he is waiting on insurance to decide if they are going to cover the sleep study, and states once the insurance company has approved sleep study they will fax the results.

## 2018-05-21 ENCOUNTER — HOSPITAL ENCOUNTER (OUTPATIENT)
Dept: LAB | Age: 53
Discharge: HOME | End: 2018-05-21
Payer: COMMERCIAL

## 2018-05-21 DIAGNOSIS — R80.9 CONTROLLED TYPE 2 DIABETES MELLITUS WITH MICROALBUMINURIA, WITHOUT LONG-TERM CURRENT USE OF INSULIN (HCC): ICD-10-CM

## 2018-05-21 DIAGNOSIS — I10 ESSENTIAL HYPERTENSION: ICD-10-CM

## 2018-05-21 DIAGNOSIS — E78.00 HIGH CHOLESTEROL: ICD-10-CM

## 2018-05-21 DIAGNOSIS — E11.29 CONTROLLED TYPE 2 DIABETES MELLITUS WITH MICROALBUMINURIA, WITHOUT LONG-TERM CURRENT USE OF INSULIN (HCC): ICD-10-CM

## 2018-05-21 LAB
ALBUMIN SERPL-MCNC: 3.9 G/DL (ref 3.4–5)
ALBUMIN/GLOB SERPL: 1.3 {RATIO} (ref 0.8–1.7)
ALP SERPL-CCNC: 77 U/L (ref 45–117)
ALT SERPL-CCNC: 45 U/L (ref 16–61)
ANION GAP SERPL CALC-SCNC: 10 MMOL/L (ref 3–18)
AST SERPL-CCNC: 17 U/L (ref 15–37)
BILIRUB SERPL-MCNC: 0.4 MG/DL (ref 0.2–1)
BUN SERPL-MCNC: 17 MG/DL (ref 7–18)
BUN/CREAT SERPL: 22 (ref 12–20)
CALCIUM SERPL-MCNC: 8.9 MG/DL (ref 8.5–10.1)
CHLORIDE SERPL-SCNC: 104 MMOL/L (ref 100–108)
CHOLEST SERPL-MCNC: 121 MG/DL
CO2 SERPL-SCNC: 24 MMOL/L (ref 21–32)
CREAT SERPL-MCNC: 0.79 MG/DL (ref 0.6–1.3)
GLOBULIN SER CALC-MCNC: 3 G/DL (ref 2–4)
GLUCOSE SERPL-MCNC: 94 MG/DL (ref 74–99)
HBA1C MFR BLD: 6.2 % (ref 4.2–5.6)
HDLC SERPL-MCNC: 32 MG/DL (ref 40–60)
HDLC SERPL: 3.8 {RATIO} (ref 0–5)
LDLC SERPL CALC-MCNC: 65 MG/DL (ref 0–100)
LIPID PROFILE,FLP: ABNORMAL
POTASSIUM SERPL-SCNC: 4.8 MMOL/L (ref 3.5–5.5)
PROT SERPL-MCNC: 6.9 G/DL (ref 6.4–8.2)
SODIUM SERPL-SCNC: 138 MMOL/L (ref 136–145)
TRIGL SERPL-MCNC: 120 MG/DL (ref ?–150)
VLDLC SERPL CALC-MCNC: 24 MG/DL

## 2018-05-21 PROCEDURE — 83036 HEMOGLOBIN GLYCOSYLATED A1C: CPT | Performed by: INTERNAL MEDICINE

## 2018-05-21 PROCEDURE — 80053 COMPREHEN METABOLIC PANEL: CPT | Performed by: INTERNAL MEDICINE

## 2018-05-21 PROCEDURE — 82043 UR ALBUMIN QUANTITATIVE: CPT | Performed by: INTERNAL MEDICINE

## 2018-05-21 PROCEDURE — 36415 COLL VENOUS BLD VENIPUNCTURE: CPT | Performed by: INTERNAL MEDICINE

## 2018-05-21 PROCEDURE — 80061 LIPID PANEL: CPT | Performed by: INTERNAL MEDICINE

## 2018-05-22 LAB
CREAT UR-MCNC: 161.79 MG/DL (ref 30–125)
MICROALBUMIN UR-MCNC: 110 MG/DL (ref 0–3)
MICROALBUMIN/CREAT UR-RTO: 680 MG/G (ref 0–30)

## 2018-06-01 RX ORDER — ATORVASTATIN CALCIUM 40 MG/1
TABLET, FILM COATED ORAL
Qty: 30 TAB | Refills: 3 | Status: SHIPPED | OUTPATIENT
Start: 2018-06-01 | End: 2018-10-03 | Stop reason: SDUPTHER

## 2018-06-01 RX ORDER — LISINOPRIL 20 MG/1
TABLET ORAL
Qty: 30 TAB | Refills: 3 | Status: SHIPPED | OUTPATIENT
Start: 2018-06-01 | End: 2018-10-03 | Stop reason: SDUPTHER

## 2018-06-01 RX ORDER — AMLODIPINE BESYLATE 10 MG/1
TABLET ORAL
Qty: 30 TAB | Refills: 3 | Status: SHIPPED | OUTPATIENT
Start: 2018-06-01 | End: 2018-07-23 | Stop reason: ALTCHOICE

## 2018-06-04 ENCOUNTER — OFFICE VISIT (OUTPATIENT)
Dept: FAMILY MEDICINE CLINIC | Age: 53
End: 2018-06-04

## 2018-06-04 VITALS
HEART RATE: 101 BPM | WEIGHT: 249.4 LBS | RESPIRATION RATE: 12 BRPM | HEIGHT: 70 IN | TEMPERATURE: 98.1 F | DIASTOLIC BLOOD PRESSURE: 73 MMHG | OXYGEN SATURATION: 96 % | BODY MASS INDEX: 35.71 KG/M2 | SYSTOLIC BLOOD PRESSURE: 128 MMHG

## 2018-06-04 DIAGNOSIS — Z12.11 COLON CANCER SCREENING: ICD-10-CM

## 2018-06-04 DIAGNOSIS — E11.29 CONTROLLED TYPE 2 DIABETES MELLITUS WITH MICROALBUMINURIA, WITHOUT LONG-TERM CURRENT USE OF INSULIN (HCC): Primary | ICD-10-CM

## 2018-06-04 DIAGNOSIS — E78.00 HIGH CHOLESTEROL: ICD-10-CM

## 2018-06-04 DIAGNOSIS — R80.9 CONTROLLED TYPE 2 DIABETES MELLITUS WITH MICROALBUMINURIA, WITHOUT LONG-TERM CURRENT USE OF INSULIN (HCC): Primary | ICD-10-CM

## 2018-06-04 DIAGNOSIS — I10 ESSENTIAL HYPERTENSION: ICD-10-CM

## 2018-06-04 DIAGNOSIS — D22.9 ATYPICAL MOLE: ICD-10-CM

## 2018-06-04 DIAGNOSIS — E55.9 VITAMIN D DEFICIENCY: ICD-10-CM

## 2018-06-04 RX ORDER — GUAIFENESIN 100 MG/5ML
81 LIQUID (ML) ORAL DAILY
COMMUNITY
Start: 2018-06-04

## 2018-06-04 NOTE — PROGRESS NOTES
Subjective:       Chief Complaint  The patient presents for follow up of diabetes, hypertension and high cholesterol. HPI  [place]  is a 45 y.o. male seen for follow up of diabetes. Healso has hypertension and hyperlipidemia. Diabetes well controlled, on Janumet, and Trulicity, pt has been doing well with weight loss and has lost about 30 pounds, hypertension well controlled, on Norvasc and lisinopril which is also for his proteinuria, hyperlipidemia well controlled, on Lipitor. Diet and Lifestyle: generally follows a low fat low cholesterol diet, does not rigorously follow a diabetic diet, sedentary    Home BP Monitoring: is not measured at home. Diabetic Review of Systems - home glucose monitoring: is performed sporadically     Other symptoms and concerns: pt continues on Keppra for possible stroke/ seizure he had. Pt continues on OTC supplementation for his vit D deficiency. Pt continue to smoke tobacco     Pt has been told that he snores and he feels sleepy during the day. His neck size is 18. Pt is at high risk for sleep apnea so will set pt up for Home Sleep study to evaluate for sleep apnea. Discussed the patient's BMI with him. The BMI follow up plan is as follows: BMI is out of normal parameters and plan is as follows: I have counseled this patient on diet and exercise regimens. Current Outpatient Prescriptions   Medication Sig    aspirin 81 mg chewable tablet Take 1 Tab by mouth daily.  atorvastatin (LIPITOR) 40 mg tablet take 1 tablet by mouth once daily    amLODIPine (NORVASC) 10 mg tablet take 1 tablet by mouth once daily    lisinopril (PRINIVIL, ZESTRIL) 20 mg tablet take 1 tablet by mouth once daily    JANUMET XR 50-1,000 mg TM24 take 2 tablet by mouth once daily WITH DINNER    levETIRAcetam (KEPPRA) 500 mg tablet take 1 tablet by mouth twice a day    glucose blood VI test strips (FREESTYLE LITE STRIPS) strip Check blood sugar daily. Dx E11.29   Jorge Lennon dulaglutide (TRULICITY) 1.5 UT/6.7 mL sub-q pen 0.5 mL by SubCUTAneous route every seven (7) days.  PROAIR HFA 90 mcg/actuation inhaler inhale 2 puffs by mouth every 4 hours AS NEEDED for wheezing    acetaminophen (TYLENOL) 325 mg tablet Take 325 mg by mouth every four (4) hours as needed for Pain.  acetaminophen-codeine (TYLENOL #3) 300-30 mg per tablet take 1 tablet by mouth every 4 hours if needed for pain    tadalafil (CIALIS) 5 mg tablet Take 1 Tab by mouth as needed.  phentermine (ADIPEX-P) 37.5 mg tablet Take 1 Tab by mouth every morning. Max Daily Amount: 37.5 mg. No current facility-administered medications for this visit.               Review of Systems  Respiratory: negative for dyspnea on exertion  Cardiovascular: negative for chest pain    Objective:     Visit Vitals    /73 (BP 1 Location: Left arm, BP Patient Position: Sitting)    Pulse (!) 101    Temp 98.1 °F (36.7 °C) (Oral)    Resp 12    Ht 5' 10\" (1.778 m)    Wt 249 lb 6.4 oz (113.1 kg)    SpO2 96%    BMI 35.79 kg/m2        General appearance - alert, well appearing, and in no distress  Chest - clear to auscultation, no wheezes, rales or rhonchi, symmetric air entry  Heart - normal rate, regular rhythm, normal S1, S2, no murmurs, rubs, clicks or gallops  Extremities - peripheral pulses normal, no pedal edema, no clubbing or cyanosis      Labs:   Lab Results   Component Value Date/Time    WBC 17.1 (H) 03/30/2015 02:03 PM    HGB 17.1 03/30/2015 02:03 PM    HCT 50.8 03/30/2015 02:03 PM    PLATELET 233 64/18/5115 02:03 PM    MCV 93 03/30/2015 02:03 PM     Lab Results   Component Value Date/Time    Hemoglobin A1c 6.2 (H) 05/21/2018 08:20 AM    Hemoglobin A1c 8.9 (H) 01/17/2018 07:28 AM    Hemoglobin A1c 8.9 (H) 06/21/2016 08:43 AM    Glucose 94 05/21/2018 08:20 AM    Microalbumin/Creat ratio (mg/g creat) 680 (H) 05/21/2018 08:20 AM    Microalbumin,urine random 110.00 (H) 05/21/2018 08:20 AM    LDL, calculated 65 05/21/2018 08:20 AM    Creatinine 0.79 05/21/2018 08:20 AM      Lab Results   Component Value Date/Time    Cholesterol, total 121 05/21/2018 08:20 AM    HDL Cholesterol 32 (L) 05/21/2018 08:20 AM    LDL, calculated 65 05/21/2018 08:20 AM    Triglyceride 120 05/21/2018 08:20 AM    CHOL/HDL Ratio 3.8 05/21/2018 08:20 AM     Lab Results   Component Value Date/Time    ALT (SGPT) 45 05/21/2018 08:20 AM    AST (SGOT) 17 05/21/2018 08:20 AM    Alk. phosphatase 77 05/21/2018 08:20 AM    Bilirubin, total 0.4 05/21/2018 08:20 AM     Lab Results   Component Value Date/Time    GFR est AA >60 05/21/2018 08:20 AM    GFR est non-AA >60 05/21/2018 08:20 AM    Creatinine 0.79 05/21/2018 08:20 AM    BUN 17 05/21/2018 08:20 AM    Sodium 138 05/21/2018 08:20 AM    Potassium 4.8 05/21/2018 08:20 AM    Chloride 104 05/21/2018 08:20 AM    CO2 24 05/21/2018 08:20 AM      Lab Results   Component Value Date/Time    Glucose 94 05/21/2018 08:20 AM      Labs:   Lab Results   Component Value Date/Time    Hemoglobin A1c 6.2 (H) 05/21/2018 08:20 AM    Hemoglobin A1c (POC) 9.8 08/30/2017 05:55 PM            Assessment / Plan     Diabetes well controlled. Will continue on 2  Janumet XR 44/5708 and  Trulicity. Patient will continue to lose weight. Hypertension well controlled, on current meds. Hyperlipidemia well controlled, on Lipitor 40 mg. ICD-10-CM ICD-9-CM    1. Controlled type 2 diabetes mellitus with microalbuminuria, without long-term current use of insulin (HCC) E11.29 250.40 MICROALBUMIN, UR, RAND W/ MICROALB/CREAT RATIO    R80.9 791.0 HEMOGLOBIN A1C W/O EAG      HEMOGLOBIN A1C W/O EAG   2. Essential hypertension E02 385.7 METABOLIC PANEL, COMPREHENSIVE      METABOLIC PANEL, COMPREHENSIVE   3. High cholesterol E78.00 272.0 LIPID PANEL      LIPID PANEL   4. Vitamin D deficiency E55.9 927.1 Control uncertain in current supplementation VITAMIN D, 25 HYDROXY   5. Atypical mole D22.9 216.9 REFERRAL TO DERMATOLOGY   6.  Colon cancer screening Z12.11 V76.51 REFERRAL TO GASTROENTEROLOGY              Medication instructions and potential side effects reviewed with patient in detail. Diabetic issues reviewed with him: diabetic diet discussed in detail, and low cholesterol diet, weight control and daily exercise discussed. Follow-up Disposition:  Return in about 6 months (around 12/4/2018) for labs 1 week before. Reviewed plan of care. Patient has provided input and agrees with goals. Discussed the patient's BMI with him. The BMI follow up plan is as follows:     dietary management education, guidance, and counseling  encourage exercise  monitor weight  prescribed dietary intake    An After Visit Summary was printed and given to the patient.

## 2018-06-04 NOTE — PROGRESS NOTES
Chief Complaint   Patient presents with    Follow-up     labs     1. Have you been to the ER, urgent care clinic since your last visit? Hospitalized since your last visit? No    2. Have you seen or consulted any other health care providers outside of the [address] [place] [address] since your last visit? Include any pap smears or colon screening. No    Do you have an [facility] [address]?  No and was given information about ACP    Pt noticed L tricep a black lump

## 2018-06-04 NOTE — MR AVS SNAPSHOT
[address] [place] [address] 
 
 
 [address] [address] [address] [address] 
[phone] Patient: Thomas Austin MRN: [id number] :1965 Visit Information Date & Time Provider Department Dept. Phone Encounter #  
 2018  3:45 PM Felicia Graves, [address] [place] [address] [phone] [id number] Follow-up Instructions Return in about 6 months (around 2018) for labs 1 week before. Upcoming Health Maintenance Date Due  
 FOOT EXAM Q1 1973 Pneumococcal 19-64 Medium Risk (1 of 1 - PPSV23) 1982 DTaP/Tdap/Td series (1 - Tdap) 1984 COLONOSCOPY 2015 EYE EXAM RETINAL OR DILATED Q1 2015 Influenza Age 5 to Adult 2018 HEMOGLOBIN A1C Q6M 2018 MICROALBUMIN Q1 2019 LIPID PANEL Q1 2019 Allergies as of 2018  Review Complete On: 2018 By: Lam Marino No Known Allergies Current Immunizations  Reviewed on 2015 Name Date Influenza Vaccine (Quad) PF 2015 Not reviewed this visit You Were Diagnosed With   
  
 Codes Comments Controlled type 2 diabetes mellitus with microalbuminuria, without long-term current use of insulin (HCC)    -  Primary ICD-10-CM: E11.29, R80.9 ICD-9-CM: 250.40, 791.0 Essential hypertension     ICD-10-CM: I10 
ICD-9-CM: 401.9 High cholesterol     ICD-10-CM: E78.00 ICD-9-CM: 272.0 Vitamin D deficiency     ICD-10-CM: E55.9 ICD-9-CM: 268.9 Atypical mole     ICD-10-CM: D22.9 ICD-9-CM: 216.9 Vitals BP Pulse Temp Resp Height(growth percentile) Weight(growth percentile) 128/73 (BP 1 Location: Left arm, BP Patient Position: Sitting) (!) 101 98.1 °F (36.7 °C) (Oral) 12 5' 10\" (1.778 m) 249 lb 6.4 oz (113.1 kg) SpO2 BMI Smoking Status 96% 35.79 kg/m2 Current Some Day Smoker BMI and BSA Data Body Mass Index Body Surface Area 35.79 kg/m 2 2.36 m 2 Preferred Pharmacy Pharm[facility] Name Phone [facility] [phone] Your Updated Medication List  
  
   
This list is accurate as of 6/4/18  4:07 PM.  Always use your most recent med list.  
  
  
  
  
 acetaminophen 325 mg tablet Commonly known as:  TYLENOL Take 325 mg by mouth every four (4) hours as needed for Pain. acetaminophen-codeine 300-30 mg per tablet Commonly known as:  TYLENOL #3  
take 1 tablet by mouth every 4 hours if needed for pain  
  
 amLODIPine 10 mg tablet Commonly known as:  NORVASC  
take 1 tablet by mouth once daily  
  
 aspirin 81 mg chewable tablet Take 1 Tab by mouth daily. atorvastatin 40 mg tablet Commonly known as:  LIPITOR  
take 1 tablet by mouth once daily  
  
 dulaglutide 1.5 mg/0.5 mL sub-q pen Commonly known as:  TRULICITY  
0.5 mL by SubCUTAneous route every seven (7) days. glucose blood VI test strips strip Commonly known as:  FREESTYLE LITE STRIPS Check blood sugar daily. Dx E11.29  
  
 JANUMET XR 50-1,000 mg Tm24 Generic drug:  SITagliptin-metFORMIN  
take 2 tablet by mouth once daily WITH DINNER  
  
 levETIRAcetam 500 mg tablet Commonly known as:  KEPPRA  
take 1 tablet by mouth twice a day  
  
 lisinopril 20 mg tablet Commonly known as:  PRINIVIL, ZESTRIL  
take 1 tablet by mouth once daily  
  
 phentermine 37.5 mg tablet Commonly known as:  ADIPEX-P Take 1 Tab by mouth every morning. Max Daily Amount: 37.5 mg. PROAIR HFA 90 mcg/actuation inhaler Generic drug:  albuterol  
inhale 2 puffs by mouth every 4 hours AS NEEDED for wheezing  
  
 tadalafil 5 mg tablet Commonly known as:  CIALIS Take 1 Tab by mouth as needed. We Performed the Following REFERRAL TO DERMATOLOGY [REF19 Custom] Comments:  
 Refer to Dr Gucci Nichols for mole/cyst  left arm Follow-up Instructions Return in about 6 months (around 12/4/2018) for labs 1 week before. To-Do List   
 12/02/2018 Lab:  LIPID PANEL   
  
 12/02/2018 Lab:  METABOLIC PANEL, COMPREHENSIVE   
  
 12/03/2018 Lab:  MICROALBUMIN, UR, RAND W/ MICROALB/CREAT RATIO   
  
 12/04/2018 Lab:  HEMOGLOBIN A1C W/O EAG Referral Information Referral ID Referred By Referred To  
  
 [id number] ILAN FISHER[address] [facility] [address], [place]   
    [facility] [address] Phone: [phone] Fax: [phone] Visits Status Start Date End Date 1 New Request 6/4/18 6/4/19 If your referral has a status of pending review or denied, additional information will be sent to support the outcome of this decision. Introducing \[facility] [place] [facility]\"" & HEALTH SERVICES! Dear Janette Roman: 
Thank you for requesting a [employer] account. Our records indicate that you already have an active [employer] account. You can access your account anytime at [email or web address] Did you know that you can access your hospital and ER discharge instructions at any time in [employer]? You can also review all of your test results from your hospital stay or ER visit. Additional Information If you have questions, please visit the Frequently Asked Questions section of the [employer] website at [email or web address] Remember, [employer] is NOT to be used for urgent needs. For medical emergencies, dial 911. Now available from your iPhone and Android! Please provide this summary of care documentation to your next provider. Your primary care clinician is listed as ILAN FISHER. If you have any questions after today's visit, please call [phone].

## 2018-08-19 RX ORDER — DULAGLUTIDE 1.5 MG/.5ML
INJECTION, SOLUTION SUBCUTANEOUS
Qty: 2 ML | Refills: 5 | Status: SHIPPED | OUTPATIENT
Start: 2018-08-19 | End: 2019-04-02 | Stop reason: SDUPTHER

## 2018-10-03 RX ORDER — ATORVASTATIN CALCIUM 40 MG/1
TABLET, FILM COATED ORAL
Qty: 30 TAB | Refills: 3 | Status: SHIPPED | OUTPATIENT
Start: 2018-10-03 | End: 2019-03-11 | Stop reason: SDUPTHER

## 2018-10-03 RX ORDER — LISINOPRIL 20 MG/1
TABLET ORAL
Qty: 30 TAB | Refills: 3 | Status: SHIPPED | OUTPATIENT
Start: 2018-10-03 | End: 2019-03-11 | Stop reason: SDUPTHER

## 2018-10-14 DIAGNOSIS — R56.9 SEIZURE (HCC): ICD-10-CM

## 2018-10-14 RX ORDER — LEVETIRACETAM 500 MG/1
TABLET ORAL
Qty: 60 TAB | Refills: 5 | Status: SHIPPED | OUTPATIENT
Start: 2018-10-14 | End: 2019-05-27 | Stop reason: SDUPTHER

## 2018-10-21 RX ORDER — SITAGLIPTIN AND METFORMIN HYDROCHLORIDE 50; 1000 MG/1; MG/1
TABLET, FILM COATED, EXTENDED RELEASE ORAL
Qty: 60 TAB | Refills: 5 | Status: SHIPPED | OUTPATIENT
Start: 2018-10-21 | End: 2018-10-21 | Stop reason: SDUPTHER

## 2018-11-14 RX ORDER — ALBUTEROL SULFATE 90 UG/1
AEROSOL, METERED RESPIRATORY (INHALATION)
Qty: 1 INHALER | Refills: 3 | Status: SHIPPED | OUTPATIENT
Start: 2018-11-14 | End: 2019-09-27 | Stop reason: SDUPTHER

## 2018-11-27 ENCOUNTER — HOSPITAL ENCOUNTER (OUTPATIENT)
Dept: LAB | Age: 53
Discharge: HOME | End: 2018-11-27
Payer: COMMERCIAL

## 2018-11-27 DIAGNOSIS — R80.9 CONTROLLED TYPE 2 DIABETES MELLITUS WITH MICROALBUMINURIA, WITHOUT LONG-TERM CURRENT USE OF INSULIN (HCC): ICD-10-CM

## 2018-11-27 DIAGNOSIS — E55.9 VITAMIN D DEFICIENCY: ICD-10-CM

## 2018-11-27 DIAGNOSIS — I10 ESSENTIAL HYPERTENSION: ICD-10-CM

## 2018-11-27 DIAGNOSIS — E11.29 CONTROLLED TYPE 2 DIABETES MELLITUS WITH MICROALBUMINURIA, WITHOUT LONG-TERM CURRENT USE OF INSULIN (HCC): ICD-10-CM

## 2018-11-27 DIAGNOSIS — E78.00 HIGH CHOLESTEROL: ICD-10-CM

## 2018-11-27 LAB
ALBUMIN SERPL-MCNC: 3.5 G/DL (ref 3.4–5)
ALBUMIN/GLOB SERPL: 1.3 {RATIO} (ref 0.8–1.7)
ALP SERPL-CCNC: 76 U/L (ref 45–117)
ALT SERPL-CCNC: 51 U/L (ref 16–61)
ANION GAP SERPL CALC-SCNC: 7 MMOL/L (ref 3–18)
AST SERPL-CCNC: 20 U/L (ref 15–37)
BILIRUB SERPL-MCNC: 0.2 MG/DL (ref 0.2–1)
BUN SERPL-MCNC: 10 MG/DL (ref 7–18)
BUN/CREAT SERPL: 14 (ref 12–20)
CALCIUM SERPL-MCNC: 8.9 MG/DL (ref 8.5–10.1)
CHLORIDE SERPL-SCNC: 107 MMOL/L (ref 100–108)
CHOLEST SERPL-MCNC: 118 MG/DL
CO2 SERPL-SCNC: 28 MMOL/L (ref 21–32)
CREAT SERPL-MCNC: 0.7 MG/DL (ref 0.6–1.3)
GLOBULIN SER CALC-MCNC: 2.8 G/DL (ref 2–4)
GLUCOSE SERPL-MCNC: 97 MG/DL (ref 74–99)
HBA1C MFR BLD: 6.1 % (ref 4.2–5.6)
HDLC SERPL-MCNC: 39 MG/DL (ref 40–60)
HDLC SERPL: 3 {RATIO} (ref 0–5)
LDLC SERPL CALC-MCNC: 54.4 MG/DL (ref 0–100)
LIPID PROFILE,FLP: ABNORMAL
POTASSIUM SERPL-SCNC: 4.9 MMOL/L (ref 3.5–5.5)
PROT SERPL-MCNC: 6.3 G/DL (ref 6.4–8.2)
SODIUM SERPL-SCNC: 142 MMOL/L (ref 136–145)
TRIGL SERPL-MCNC: 123 MG/DL (ref ?–150)
VLDLC SERPL CALC-MCNC: 24.6 MG/DL

## 2018-11-27 PROCEDURE — 82043 UR ALBUMIN QUANTITATIVE: CPT

## 2018-11-27 PROCEDURE — 36415 COLL VENOUS BLD VENIPUNCTURE: CPT

## 2018-11-27 PROCEDURE — 83036 HEMOGLOBIN GLYCOSYLATED A1C: CPT

## 2018-11-27 PROCEDURE — 82306 VITAMIN D 25 HYDROXY: CPT

## 2018-11-27 PROCEDURE — 80053 COMPREHEN METABOLIC PANEL: CPT

## 2018-11-27 PROCEDURE — 80061 LIPID PANEL: CPT

## 2018-11-28 LAB
25(OH)D3 SERPL-MCNC: 27.7 NG/ML (ref 30–100)
CREAT UR-MCNC: 127 MG/DL (ref 30–125)
MICROALBUMIN UR-MCNC: 137 MG/DL (ref 0–3)
MICROALBUMIN/CREAT UR-RTO: 1079 MG/G (ref 0–30)

## 2018-12-06 ENCOUNTER — OFFICE VISIT (OUTPATIENT)
Dept: FAMILY MEDICINE CLINIC | Age: 53
End: 2018-12-06

## 2018-12-06 VITALS
OXYGEN SATURATION: 98 % | SYSTOLIC BLOOD PRESSURE: 104 MMHG | TEMPERATURE: 98.1 F | HEIGHT: 70 IN | HEART RATE: 104 BPM | RESPIRATION RATE: 20 BRPM | BODY MASS INDEX: 35.48 KG/M2 | DIASTOLIC BLOOD PRESSURE: 70 MMHG | WEIGHT: 247.8 LBS

## 2018-12-06 DIAGNOSIS — I10 ESSENTIAL HYPERTENSION: ICD-10-CM

## 2018-12-06 DIAGNOSIS — R80.9 CONTROLLED TYPE 2 DIABETES MELLITUS WITH MICROALBUMINURIA, WITHOUT LONG-TERM CURRENT USE OF INSULIN (HCC): Primary | ICD-10-CM

## 2018-12-06 DIAGNOSIS — F17.200 TOBACCO DEPENDENCY: ICD-10-CM

## 2018-12-06 DIAGNOSIS — H02.826 CYST OF LEFT EYELID: ICD-10-CM

## 2018-12-06 DIAGNOSIS — E55.9 VITAMIN D DEFICIENCY: ICD-10-CM

## 2018-12-06 DIAGNOSIS — E11.29 CONTROLLED TYPE 2 DIABETES MELLITUS WITH MICROALBUMINURIA, WITHOUT LONG-TERM CURRENT USE OF INSULIN (HCC): Primary | ICD-10-CM

## 2018-12-06 DIAGNOSIS — E78.00 HIGH CHOLESTEROL: ICD-10-CM

## 2018-12-06 DIAGNOSIS — Z12.5 SCREENING PSA (PROSTATE SPECIFIC ANTIGEN): ICD-10-CM

## 2018-12-06 DIAGNOSIS — Z23 ENCOUNTER FOR IMMUNIZATION: ICD-10-CM

## 2018-12-06 RX ORDER — VARENICLINE TARTRATE 25 MG
KIT ORAL
Qty: 1 DOSE PACK | Refills: 0 | Status: SHIPPED | OUTPATIENT
Start: 2018-12-06 | End: 2019-11-26 | Stop reason: SINTOL

## 2018-12-06 RX ORDER — VARENICLINE TARTRATE 1 MG/1
1 TABLET, FILM COATED ORAL 2 TIMES DAILY
Qty: 60 TAB | Refills: 5 | Status: SHIPPED | OUTPATIENT
Start: 2018-12-06 | End: 2019-11-26 | Stop reason: SINTOL

## 2018-12-06 NOTE — PROGRESS NOTES
Subjective:       Chief Complaint  The patient presents for follow up of diabetes, hypertension and high cholesterol. RAMÍREZ Alejandra is a 52 y.o. male seen for follow up of diabetes. Healso has hypertension and hyperlipidemia. Diabetes well controlled, on Janumet, and Trulicity, pt has been doing well with weight loss and has lost about 30 pounds, hypertension well controlled, on  lisinopril which is also for his proteinuria, hyperlipidemia well controlled, on Lipitor. Diet and Lifestyle: generally follows a low fat low cholesterol diet, does not rigorously follow a diabetic diet, exercises sporadically    Home BP Monitoring: is not measured at home. Diabetic Review of Systems - home glucose monitoring: is performed sporadically     Other symptoms and concerns: pt continues on Keppra for possible stroke/ seizure he had. Pt continues on OTC supplementation for his vit D deficiency. Pt continue to smoke tobacco. He willing to try Chantix again. Patient has had a cyst on his bottom left eyelid for several months that has not improved with warm compresses and conservative treatment. Will refer to ophthalmology for possible incision and drainage. Current Outpatient Medications   Medication Sig    varenicline (CHANTIX STARTER DARIN) 0.5 mg (11)- 1 mg (42) DsPk Take as directed    varenicline (CHANTIX) 1 mg tablet Take 1 Tab by mouth two (2) times a day.     PROAIR HFA 90 mcg/actuation inhaler inhale 2 puffs by mouth every 4 hours if needed for wheezing    SITagliptin-metFORMIN (JANUMET XR) 50-1,000 mg TM24 take 2 tablets by mouth once daily with dinner    levETIRAcetam (KEPPRA) 500 mg tablet take 1 tablet by mouth twice a day    atorvastatin (LIPITOR) 40 mg tablet take 1 tablet by mouth once daily    lisinopril (PRINIVIL, ZESTRIL) 20 mg tablet take 1 tablet by mouth once daily    TRULICITY 1.5 JN/6.7 mL sub-q pen inject 0.5 milliliters subcutaneously every 7 days    aspirin 81 mg chewable tablet Take 1 Tab by mouth daily.  acetaminophen (TYLENOL) 325 mg tablet Take 325 mg by mouth every four (4) hours as needed for Pain.  glucose blood VI test strips (FREESTYLE LITE STRIPS) strip Check blood sugar daily. Dx E11.29    acetaminophen-codeine (TYLENOL #3) 300-30 mg per tablet take 1 tablet by mouth every 4 hours if needed for pain    tadalafil (CIALIS) 5 mg tablet Take 1 Tab by mouth as needed. No current facility-administered medications for this visit.               Review of Systems  Respiratory: negative for dyspnea on exertion  Cardiovascular: negative for chest pain    Objective:     Visit Vitals  /70 (BP 1 Location: Left arm, BP Patient Position: Sitting)   Pulse (!) 104   Temp 98.1 °F (36.7 °C) (Oral)   Resp 20   Ht 5' 10\" (1.778 m)   Wt 247 lb 12.8 oz (112.4 kg)   SpO2 98%   BMI 35.56 kg/m²        General appearance - alert, well appearing, and in no distress  Chest - clear to auscultation, no wheezes, rales or rhonchi, symmetric air entry  Heart - normal rate, regular rhythm, normal S1, S2, no murmurs, rubs, clicks or gallops  Extremities - peripheral pulses normal, no pedal edema, no clubbing or cyanosis      Labs:   Lab Results   Component Value Date/Time    WBC 17.1 (H) 03/30/2015 02:03 PM    HGB 17.1 03/30/2015 02:03 PM    HCT 50.8 03/30/2015 02:03 PM    PLATELET 943 41/92/1375 02:03 PM    MCV 93 03/30/2015 02:03 PM     Lab Results   Component Value Date/Time    Hemoglobin A1c 6.1 (H) 11/27/2018 08:37 AM    Hemoglobin A1c 6.2 (H) 05/21/2018 08:20 AM    Hemoglobin A1c 8.9 (H) 01/17/2018 07:28 AM    Glucose 97 11/27/2018 08:37 AM    Microalbumin/Creat ratio (mg/g creat) 1,079 (H) 11/27/2018 08:37 AM    Microalbumin,urine random 137.00 (H) 11/27/2018 08:37 AM    LDL, calculated 54.4 11/27/2018 08:37 AM    Creatinine 0.70 11/27/2018 08:37 AM      Lab Results   Component Value Date/Time    Cholesterol, total 118 11/27/2018 08:37 AM    HDL Cholesterol 39 (L) 11/27/2018 08:37 AM    LDL, calculated 54.4 11/27/2018 08:37 AM    Triglyceride 123 11/27/2018 08:37 AM    CHOL/HDL Ratio 3.0 11/27/2018 08:37 AM     Lab Results   Component Value Date/Time    ALT (SGPT) 51 11/27/2018 08:37 AM    AST (SGOT) 20 11/27/2018 08:37 AM    Alk. phosphatase 76 11/27/2018 08:37 AM    Bilirubin, total 0.2 11/27/2018 08:37 AM     Lab Results   Component Value Date/Time    GFR est AA >60 11/27/2018 08:37 AM    GFR est non-AA >60 11/27/2018 08:37 AM    Creatinine 0.70 11/27/2018 08:37 AM    BUN 10 11/27/2018 08:37 AM    Sodium 142 11/27/2018 08:37 AM    Potassium 4.9 11/27/2018 08:37 AM    Chloride 107 11/27/2018 08:37 AM    CO2 28 11/27/2018 08:37 AM      Lab Results   Component Value Date/Time    Glucose 97 11/27/2018 08:37 AM      Labs:   Lab Results   Component Value Date/Time    Hemoglobin A1c 6.1 (H) 11/27/2018 08:37 AM    Hemoglobin A1c (POC) 9.8 08/30/2017 05:55 PM            Assessment / Plan     Diabetes well controlled. Will continue on 2  Janumet XR 25/9931 and  Trulicity. Patient will continue to lose weight. Hypertension well controlled, on lisinopril 20 mg   Hyperlipidemia well controlled, on Lipitor 40 mg. ICD-10-CM ICD-9-CM    1. Controlled type 2 diabetes mellitus with microalbuminuria, without long-term current use of insulin ([facility]) E11.29 250.40 HEMOGLOBIN A1C W/O EAG    R80.9 791.0    2. Essential hypertension C52 740.7 METABOLIC PANEL, COMPREHENSIVE   3. High cholesterol E78.00 272.0 LIPID PANEL   4. Vitamin D deficiency E55.9 268.9  fairly well controlled patient to take vitamin D 2000 units daily on a regular basis vITAMIN D, 25 HYDROXY   5. Cyst of left eyelid H02.826 374.84 REFERRAL TO OPHTHALMOLOGY   6. Tobacco dependency F17.200 305.1  will try varenicline (CHANTIX STARTER DARIN) 0.5 mg (11)- 1 mg (42) DsPk      varenicline (CHANTIX) 1 mg tablet   7.  Encounter for immunization Z23 V03.89 INFLUENZA VIRUS VAC QUAD,SPLIT,PRESV FREE SYRINGE IM      PNEUMOCOCCAL POLYSACCHARIDE VACCINE, 23-VALENT, ADULT OR IMMUNOSUPPRESSED PT DOSE,   8. Screening PSA (prostate specific antigen) Z12.5 V76.44 PSA, DIAGNOSTIC (PROSTATE SPECIFIC AG)              Medication instructions and potential side effects reviewed with patient in detail. Diabetic issues reviewed with him: diabetic diet discussed in detail, and low cholesterol diet, weight control and daily exercise discussed. Follow-up Disposition:  Return in about 6 months (around 6/6/2019) for labs 1 week before. Reviewed plan of care. Patient has provided input and agrees with goals. Discussed the patient's BMI with him. The BMI follow up plan is as follows:     dietary management education, guidance, and counseling  encourage exercise  monitor weight  prescribed dietary intake    An After Visit Summary was printed and given to the patient.

## 2018-12-06 NOTE — PROGRESS NOTES
Verbal order read back per Dr. Gonzales Fraser Pneumovax and Flu vaccine. Patient received Pneumovax vaccine int he left deltoid and flu vaccine in right deltoid. Patient was observed and no signs or symptoms of allergic reaction noted at this time. Patient tolerated well and left without complaints. Patient received flu and Pneumovax VIS.

## 2018-12-06 NOTE — PROGRESS NOTES
Chief Complaint   Patient presents with    Cyst     cyst under left eye    Results     Labwork     1. Have you been to the ER, urgent care clinic since your last visit? Hospitalized since your last visit? No    2. Have you seen or consulted any other health care providers outside of the [address] [place] [address] since your last visit? Include any pap smears or colon screening.  No

## 2019-03-01 RX ORDER — ATORVASTATIN CALCIUM 40 MG/1
TABLET, FILM COATED ORAL
Qty: 30 TAB | Refills: 0 | OUTPATIENT
Start: 2019-03-01

## 2019-03-01 RX ORDER — LISINOPRIL 20 MG/1
TABLET ORAL
Qty: 30 TAB | Refills: 0 | OUTPATIENT
Start: 2019-03-01

## 2019-03-03 RX ORDER — ATORVASTATIN CALCIUM 40 MG/1
TABLET, FILM COATED ORAL
Qty: 30 TAB | Refills: 0 | OUTPATIENT
Start: 2019-03-03

## 2019-08-26 ENCOUNTER — PATIENT MESSAGE (OUTPATIENT)
Dept: FAMILY MEDICINE CLINIC | Age: 54
End: 2019-08-26

## 2019-11-11 ENCOUNTER — HOSPITAL ENCOUNTER (OUTPATIENT)
Dept: LAB | Age: 54
Discharge: HOME | End: 2019-11-11
Payer: COMMERCIAL

## 2019-11-11 DIAGNOSIS — E78.00 HIGH CHOLESTEROL: ICD-10-CM

## 2019-11-11 DIAGNOSIS — E11.29 CONTROLLED TYPE 2 DIABETES MELLITUS WITH MICROALBUMINURIA, WITHOUT LONG-TERM CURRENT USE OF INSULIN (HCC): ICD-10-CM

## 2019-11-11 DIAGNOSIS — R80.9 CONTROLLED TYPE 2 DIABETES MELLITUS WITH MICROALBUMINURIA, WITHOUT LONG-TERM CURRENT USE OF INSULIN (HCC): ICD-10-CM

## 2019-11-11 DIAGNOSIS — Z12.5 PROSTATE CANCER SCREENING: ICD-10-CM

## 2019-11-11 DIAGNOSIS — I10 ESSENTIAL HYPERTENSION: ICD-10-CM

## 2019-11-11 DIAGNOSIS — E55.9 VITAMIN D DEFICIENCY: ICD-10-CM

## 2019-11-11 LAB
ALBUMIN SERPL-MCNC: 3.4 G/DL (ref 3.4–5)
ALBUMIN/GLOB SERPL: 1.1 {RATIO} (ref 0.8–1.7)
ALP SERPL-CCNC: 80 U/L (ref 45–117)
ALT SERPL-CCNC: 76 U/L (ref 16–61)
ANION GAP SERPL CALC-SCNC: 6 MMOL/L (ref 3–18)
AST SERPL-CCNC: 26 U/L (ref 10–38)
BILIRUB SERPL-MCNC: 0.4 MG/DL (ref 0.2–1)
BUN SERPL-MCNC: 14 MG/DL (ref 7–18)
BUN/CREAT SERPL: 16 (ref 12–20)
CALCIUM SERPL-MCNC: 8.9 MG/DL (ref 8.5–10.1)
CHLORIDE SERPL-SCNC: 104 MMOL/L (ref 100–111)
CO2 SERPL-SCNC: 27 MMOL/L (ref 21–32)
CREAT SERPL-MCNC: 0.87 MG/DL (ref 0.6–1.3)
GLOBULIN SER CALC-MCNC: 3 G/DL (ref 2–4)
GLUCOSE SERPL-MCNC: 138 MG/DL (ref 74–99)
HBA1C MFR BLD: 7.7 % (ref 4.2–5.6)
POTASSIUM SERPL-SCNC: 4.8 MMOL/L (ref 3.5–5.5)
PROT SERPL-MCNC: 6.4 G/DL (ref 6.4–8.2)
SODIUM SERPL-SCNC: 137 MMOL/L (ref 136–145)

## 2019-11-11 PROCEDURE — 83036 HEMOGLOBIN GLYCOSYLATED A1C: CPT

## 2019-11-11 PROCEDURE — 36415 COLL VENOUS BLD VENIPUNCTURE: CPT

## 2019-11-11 PROCEDURE — 84153 ASSAY OF PSA TOTAL: CPT

## 2019-11-11 PROCEDURE — 82043 UR ALBUMIN QUANTITATIVE: CPT

## 2019-11-11 PROCEDURE — 82306 VITAMIN D 25 HYDROXY: CPT

## 2019-11-11 PROCEDURE — 80053 COMPREHEN METABOLIC PANEL: CPT

## 2019-11-11 PROCEDURE — 80061 LIPID PANEL: CPT

## 2019-11-12 LAB
25(OH)D3 SERPL-MCNC: 26.6 NG/ML (ref 30–100)
CHOLEST SERPL-MCNC: 150 MG/DL
CREAT UR-MCNC: 192 MG/DL (ref 30–125)
HDLC SERPL-MCNC: 31 MG/DL (ref 40–60)
HDLC SERPL: 4.8 {RATIO} (ref 0–5)
LDLC SERPL CALC-MCNC: 85.6 MG/DL (ref 0–100)
LIPID PROFILE,FLP: ABNORMAL
MICROALBUMIN UR-MCNC: 365 MG/DL (ref 0–3)
MICROALBUMIN/CREAT UR-RTO: 1901 MG/G (ref 0–30)
PSA SERPL-MCNC: 0.4 NG/ML (ref 0–4)
TRIGL SERPL-MCNC: 167 MG/DL (ref ?–150)
VLDLC SERPL CALC-MCNC: 33.4 MG/DL

## 2019-11-26 ENCOUNTER — OFFICE VISIT (OUTPATIENT)
Dept: FAMILY MEDICINE CLINIC | Age: 54
End: 2019-11-26

## 2019-11-26 VITALS
RESPIRATION RATE: 20 BRPM | WEIGHT: 276.4 LBS | SYSTOLIC BLOOD PRESSURE: 124 MMHG | HEIGHT: 70 IN | HEART RATE: 100 BPM | DIASTOLIC BLOOD PRESSURE: 78 MMHG | TEMPERATURE: 98.2 F | BODY MASS INDEX: 39.57 KG/M2 | OXYGEN SATURATION: 98 %

## 2019-11-26 DIAGNOSIS — M25.522 BILATERAL ELBOW JOINT PAIN: ICD-10-CM

## 2019-11-26 DIAGNOSIS — M25.521 BILATERAL ELBOW JOINT PAIN: ICD-10-CM

## 2019-11-26 DIAGNOSIS — R56.9 SEIZURE (HCC): ICD-10-CM

## 2019-11-26 DIAGNOSIS — R80.9 CONTROLLED TYPE 2 DIABETES MELLITUS WITH MICROALBUMINURIA, WITHOUT LONG-TERM CURRENT USE OF INSULIN (HCC): Primary | ICD-10-CM

## 2019-11-26 DIAGNOSIS — Z12.11 COLON CANCER SCREENING: ICD-10-CM

## 2019-11-26 DIAGNOSIS — I10 ESSENTIAL HYPERTENSION: ICD-10-CM

## 2019-11-26 DIAGNOSIS — E11.29 CONTROLLED TYPE 2 DIABETES MELLITUS WITH MICROALBUMINURIA, WITHOUT LONG-TERM CURRENT USE OF INSULIN (HCC): Primary | ICD-10-CM

## 2019-11-26 DIAGNOSIS — Z23 ENCOUNTER FOR IMMUNIZATION: ICD-10-CM

## 2019-11-26 DIAGNOSIS — E55.9 VITAMIN D DEFICIENCY: ICD-10-CM

## 2019-11-26 DIAGNOSIS — E78.00 HIGH CHOLESTEROL: ICD-10-CM

## 2019-11-26 RX ORDER — METFORMIN HYDROCHLORIDE 750 MG/1
750 TABLET, EXTENDED RELEASE ORAL 2 TIMES DAILY
Qty: 180 TAB | Refills: 3 | Status: SHIPPED | OUTPATIENT
Start: 2019-11-26 | End: 2020-07-14

## 2019-11-26 RX ORDER — ALBUTEROL SULFATE 90 UG/1
AEROSOL, METERED RESPIRATORY (INHALATION)
Qty: 1 INHALER | Refills: 3 | Status: SHIPPED | OUTPATIENT
Start: 2019-11-26 | End: 2020-09-22

## 2019-11-26 RX ORDER — ATORVASTATIN CALCIUM 40 MG/1
TABLET, FILM COATED ORAL
Qty: 90 TAB | Refills: 3 | Status: SHIPPED | OUTPATIENT
Start: 2019-11-26 | End: 2020-11-06

## 2019-11-26 RX ORDER — LEVETIRACETAM 500 MG/1
TABLET ORAL
Qty: 60 TAB | Refills: 0 | Status: CANCELLED | OUTPATIENT
Start: 2019-11-26

## 2019-11-26 RX ORDER — GUAIFENESIN 100 MG/5ML
81 LIQUID (ML) ORAL DAILY
Status: CANCELLED | OUTPATIENT
Start: 2019-11-26

## 2019-11-26 RX ORDER — LISINOPRIL 20 MG/1
TABLET ORAL
Qty: 90 TAB | Refills: 3 | Status: SHIPPED | OUTPATIENT
Start: 2019-11-26 | End: 2020-11-06

## 2019-11-26 NOTE — PATIENT INSTRUCTIONS
High Blood Pressure: Care Instructions Overview It's normal for blood pressure to go up and down throughout the day. But if it stays up, you have high blood pressure. Another name for high blood pressure is hypertension. Despite what a lot of people think, high blood pressure usually doesn't cause headaches or make you feel dizzy or lightheaded. It usually has no symptoms. But it does increase your risk of stroke, heart attack, and other problems. You and your doctor will talk about your risks of these problems based on your blood pressure. Your doctor will give you a goal for your blood pressure. Your goal will be based on your health and your age. Lifestyle changes, such as eating healthy and being active, are always important to help lower blood pressure. You might also take medicine to reach your blood pressure goal. 
Follow-up care is a key part of your treatment and safety. Be sure to make and go to all appointments, and call your doctor if you are having problems. It's also a good idea to know your test results and keep a list of the medicines you take. How can you care for yourself at home? Medical treatment · If you stop taking your medicine, your blood pressure will go back up. You may take one or more types of medicine to lower your blood pressure. Be safe with medicines. Take your medicine exactly as prescribed. Call your doctor if you think you are having a problem with your medicine. · Talk to your doctor before you start taking aspirin every day. Aspirin can help certain people lower their risk of a heart attack or stroke. But taking aspirin isn't right for everyone, because it can cause serious bleeding. · See your doctor regularly. You may need to see the doctor more often at first or until your blood pressure comes down. · If you are taking blood pressure medicine, talk to your doctor before you take decongestants or anti-inflammatory medicine, such as ibuprofen. Some of these medicines can raise blood pressure. · Learn how to check your blood pressure at home. Lifestyle changes · Stay at a healthy weight. This is especially important if you put on weight around the waist. Losing even 10 pounds can help you lower your blood pressure. · If your doctor recommends it, get more exercise. Walking is a good choice. Bit by bit, increase the amount you walk every day. Try for at least 30 minutes on most days of the week. You also may want to swim, bike, or do other activities. · Avoid or limit alcohol. Talk to your doctor about whether you can drink any alcohol. · Try to limit how much sodium you eat to less than 2,300 milligrams (mg) a day. Your doctor may ask you to try to eat less than 1,500 mg a day. · Eat plenty of fruits (such as bananas and oranges), vegetables, legumes, whole grains, and low-fat dairy products. · Lower the amount of saturated fat in your diet. Saturated fat is found in animal products such as milk, cheese, and meat. Limiting these foods may help you lose weight and also lower your risk for heart disease. · Do not smoke. Smoking increases your risk for heart attack and stroke. If you need help quitting, talk to your doctor about stop-smoking programs and medicines. These can increase your chances of quitting for good. When should you call for help? Call  911 anytime you think you may need emergency care. This may mean having symptoms that suggest that your blood pressure is causing a serious heart or blood vessel problem. Your blood pressure may be over 180/120. 
 For example, call  911 if: 
  · You have symptoms of a heart attack. These may include: 
? Chest pain or pressure, or a strange feeling in the chest. 
? Sweating. ? Shortness of breath. ? Nausea or vomiting. ? Pain, pressure, or a strange feeling in the back, neck, jaw, or upper belly or in one or both shoulders or arms. ? Lightheadedness or sudden weakness. ? A fast or irregular heartbeat.  
  · You have symptoms of a stroke. These may include: 
? Sudden numbness, tingling, weakness, or loss of movement in your face, arm, or leg, especially on only one side of your body. ? Sudden vision changes. ? Sudden trouble speaking. ? Sudden confusion or trouble understanding simple statements. ? Sudden problems with walking or balance. ? A sudden, severe headache that is different from past headaches.  
  · You have severe back or belly pain.  
 Do not wait until your blood pressure comes down on its own. Get help right away. 
 Call your doctor now or seek immediate care if: 
  · Your blood pressure is much higher than normal (such as 180/120 or higher), but you don't have symptoms.  
  · You think high blood pressure is causing symptoms, such as: 
? Severe headache. 
? Blurry vision.  
 Watch closely for changes in your health, and be sure to contact your doctor if: 
  · Your blood pressure measures higher than your doctor recommends at least 2 times. That means the top number is higher or the bottom number is higher, or both.  
  · You think you may be having side effects from your blood pressure medicine. Where can you learn more? Go to http://enoc-gary.info/. Enter E412 in the search box to learn more about \"High Blood Pressure: Care Instructions. \" Current as of: April 9, 2019 Content Version: 12.2 © 8816-2206 [employer]. Care instructions adapted under license by [employer] (which disclaims liability or warranty for this information). If you have questions about a medical condition or this instruction, always ask your healthcare professional. [address] [place] [address] any warranty or liability for your use of this information.

## 2019-11-26 NOTE — PROGRESS NOTES
Subjective:       Chief Complaint  The patient presents for follow up of diabetes, hypertension and high cholesterol. RAMÍREZ Mckenzie is a 62 y.o. male seen for follow up of diabetes. Healso has hypertension and hyperlipidemia. Diabetes poorly controlled, on Janumet, and Trulicity, pt has been working out of town and not eating well and gained about 30 pounds, hypertension well controlled, on  lisinopril 20 mg which is also for his proteinuria, hyperlipidemia well controlled, on Lipitor. Diet and Lifestyle: not attempting to follow a low fat, low cholesterol diet, does not rigorously follow a diabetic diet, exercises sporadically    Home BP Monitoring: is not measured at home. Diabetic Review of Systems - home glucose monitoring: is performed sporadically     Other symptoms and concerns: pt is now off Keppra and has not had any recurrent seizures. Pt is taking vit D 2000 units/day for his vit D deficiency but it is not optimal and will increase to vit D 4000 units/day    Pt continue to smoke tobacco and is trying to quit on his own. He had paranoid thoughts on Chantix. Patient has a cyst underneath his eye for which she will follow-up with Dr. 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 Has been going on for the last 2 months without any significant improvement with over-the-counter meds so will refer to orthopedics. Current Outpatient Medications   Medication Sig    albuterol (PROAIR HFA) 90 mcg/actuation inhaler inhale 2 puffs by mouth every 4 hours if needed for wheezing    dulaglutide (TRULICITY) 1.5 GA/8.6 mL sub-q pen 0.5 mL by SubCUTAneous route every seven (7) days.     atorvastatin (LIPITOR) 40 mg tablet take 1 tablet by mouth once daily    lisinopril (PRINIVIL, ZESTRIL) 20 mg tablet take 1 tablet by mouth once daily    metFORMIN ER (GLUCOPHAGE XR) 750 mg tablet Take 1 Tab by mouth two (2) times a day.  aspirin 81 mg chewable tablet Take 1 Tab by mouth daily.  glucose blood VI test strips (FREESTYLE LITE STRIPS) strip Check blood sugar daily. Dx E11.29    acetaminophen (TYLENOL) 325 mg tablet Take 325 mg by mouth every four (4) hours as needed for Pain.  tadalafil (CIALIS) 5 mg tablet Take 1 Tab by mouth as needed. No current facility-administered medications for this visit.               Review of Systems  Respiratory: negative for dyspnea on exertion  Cardiovascular: negative for chest pain    Objective:     Visit Vitals  /78 (BP 1 Location: Left arm, BP Patient Position: Sitting)   Pulse 100   Temp 98.2 °F (36.8 °C) (Oral)   Resp 20   Ht 5' 10\" (1.778 m)   Wt 276 lb 6.4 oz (125.4 kg)   SpO2 98%   BMI 39.66 kg/m²        General appearance - alert, well appearing, and in no distress  Chest - bilateral wheezes   Heart - normal rate, regular rhythm, normal S1, S2, no murmurs, rubs, clicks or gallops  Extremities - peripheral pulses normal, no pedal edema, no clubbing or cyanosis      Labs:   Lab Results   Component Value Date/Time    WBC 17.1 (H) 03/30/2015 02:03 PM    HGB 17.1 03/30/2015 02:03 PM    HCT 50.8 03/30/2015 02:03 PM    PLATELET 959 88/46/4014 02:03 PM    MCV 93 03/30/2015 02:03 PM     Lab Results   Component Value Date/Time    Hemoglobin A1c 7.7 (H) 11/11/2019 07:25 AM    Hemoglobin A1c 6.1 (H) 11/27/2018 08:37 AM    Hemoglobin A1c 6.2 (H) 05/21/2018 08:20 AM    Glucose 138 (H) 11/11/2019 07:25 AM    Microalbumin/Creat ratio (mg/g creat) 1,901 (H) 11/11/2019 07:25 AM    Microalbumin,urine random 365.00 (H) 11/11/2019 07:25 AM    LDL, calculated 85.6 11/11/2019 07:25 AM    Creatinine 0.87 11/11/2019 07:25 AM      Lab Results   Component Value Date/Time    Cholesterol, total 150 11/11/2019 07:25 AM    HDL Cholesterol 31 (L) 11/11/2019 07:25 AM    LDL, calculated 85.6 11/11/2019 07:25 AM    Triglyceride 167 (H) 11/11/2019 07:25 AM    CHOL/HDL Ratio 4.8 11/11/2019 07:25 AM     Lab Results   Component Value Date/Time    ALT (SGPT) 76 (H) 11/11/2019 07:25 AM    AST (SGOT) 26 11/11/2019 07:25 AM    Alk. phosphatase 80 11/11/2019 07:25 AM    Bilirubin, total 0.4 11/11/2019 07:25 AM     Lab Results   Component Value Date/Time    GFR est AA >60 11/11/2019 07:25 AM    GFR est non-AA >60 11/11/2019 07:25 AM    Creatinine 0.87 11/11/2019 07:25 AM    BUN 14 11/11/2019 07:25 AM    Sodium 137 11/11/2019 07:25 AM    Potassium 4.8 11/11/2019 07:25 AM    Chloride 104 11/11/2019 07:25 AM    CO2 27 11/11/2019 07:25 AM      Lab Results   Component Value Date/Time    Glucose 138 (H) 11/11/2019 07:25 AM      Labs:   Lab Results   Component Value Date/Time    Hemoglobin A1c 7.7 (H) 11/11/2019 07:25 AM    Hemoglobin A1c (POC) 9.8 08/30/2017 05:55 PM            Assessment / Plan     Diabetes borderline controlled. Will continue  Trulicity 1.5 mg and change to metformin  mg BID. Patient will try to lose weight again  Hypertension well controlled, on lisinopril 20 mg   Hyperlipidemia well controlled, on Lipitor 40 mg. ICD-10-CM ICD-9-CM    1. Controlled type 2 diabetes mellitus with microalbuminuria, without long-term current use of insulin ([facility]) E11.29 250.40 HEMOGLOBIN A1C W/O EAG    R80.9 791.0    2. Essential hypertension U87 530.0 METABOLIC PANEL, COMPREHENSIVE   3. High cholesterol E78.00 272.0 LIPID PANEL   4. Vitamin D deficiency E55.9 268.9 Uncontrolled. Will increase to vit D 4000 units/day VITAMIN D, 25 HYDROXY   5. Bilateral elbow joint pain M25.521 719.42 REFERRAL TO ORTHOPEDICS    M25.522     6. Seizure ([address] R56.9 780.39 Resolved off medication    7. Encounter for immunization Z23 V03.89 INFLUENZA VIRUS VAC QUAD,SPLIT,PRESV FREE SYRINGE IM   8. Colon cancer screening Z12.11 V76.51 REFERRAL TO GASTROENTEROLOGY              Medication instructions and potential side effects reviewed with patient in detail.     Diabetic issues reviewed with him: diabetic diet discussed in detail, and low cholesterol diet, weight control and daily exercise discussed. Follow-up and Dispositions    · Return in about 3 months (around 2/26/2020) for labs 1 week before. Reviewed plan of care. Patient has provided input and agrees with goals. Discussed the patient's BMI with him. The BMI follow up plan is as follows:     dietary management education, guidance, and counseling  encourage exercise  monitor weight  prescribed dietary intake    An After Visit Summary was printed and given to the patient.

## 2019-12-19 NOTE — MR AVS SNAPSHOT
Patricia Gill 
 
 
 [address], [place] [address] [facility] [address] 
[phone] Patient: Jaqui Wyman MRN: [id number] :1965 Visit Information Date & Time Provider Department Dept. Phone Encounter #  
 2018  1:15 PM Esteban [address] [phone] [id number] Follow-up Instructions Return in about 4 months (around 2018) for labs 1 week before. Upcoming Health Maintenance Date Due  
 FOOT EXAM Q1 1973 EYE EXAM RETINAL OR DILATED Q1 1973 Pneumococcal 19-64 Medium Risk (1 of 1 - PPSV23) 1982 DTaP/Tdap/Td series (1 - Tdap) 1984 FOBT Q 1 YEAR AGE 50-75 2013 Influenza Age 5 to Adult 2017 HEMOGLOBIN A1C Q6M 2018 MICROALBUMIN Q1 2018 LIPID PANEL Q1 2019 Allergies as of 2018  Review Complete On: 2018 By: Birdie Calle LPN No Known Allergies Current Immunizations  Reviewed on 2015 Name Date Influenza Vaccine (Quad) PF 2015 Not reviewed this visit You Were Diagnosed With   
  
 Codes Comments Controlled type 2 diabetes mellitus with microalbuminuria, without long-term current use of insulin ([facility])    -  Primary ICD-10-CM: E11.29, R80.9 ICD-9-CM: 250.40, 791.0 Obesity, morbid ([address]     ICD-10-CM: E66.01 
ICD-9-CM: 278.01 Essential hypertension     ICD-10-CM: I10 
ICD-9-CM: 401.9 High cholesterol     ICD-10-CM: E78.00 ICD-9-CM: 272.0 Vitamin D deficiency     ICD-10-CM: E55.9 ICD-9-CM: 268.9 Chronic fatigue     ICD-10-CM: R53.82 
ICD-9-CM: 780.79 Vitals BP Pulse Temp Resp Height(growth percentile) Weight(growth percentile) 124/72 (BP 1 Location: Left arm, BP Patient Position: Sitting) 97 98.4 °F (36.9 °C) (Oral) 20 5' 10\" (1.778 m) 279 lb (126.6 kg) SpO2 BMI Smoking Status 96% 40.03 kg/m2 Current Some Day Smoker BMI and BSA Data Body Mass Index Body Surface Area 40.03 kg/m 2 2.5 m 2 Preferred Pharmacy Pharmacy Name Phone [facility] [address], [address] [facility] [phone] Your Updated Medication List  
  
   
This list is accurate as of: 18  2:03 PM.  Always use your most recent med list.  
  
  
  
  
 acetaminophen 325 mg tablet Commonly known as:  TYLENOL Take 325 mg by mouth every four (4) hours as needed for Pain. acetaminophen-codeine 300-30 mg per tablet Commonly known as:  TYLENOL #3  
take 1 tablet by mouth every 4 hours if needed for pain  
  
 amLODIPine 10 mg tablet Commonly known as:  NORVASC  
take 1 tablet by mouth once daily  
  
 aspirin 325 mg tablet Commonly known as:  ASPIRIN Take 162.5 mg by mouth daily. atorvastatin 40 mg tablet Commonly known as:  LIPITOR  
take 1 tablet by mouth once daily  
  
 dulaglutide 1.5 mg/0.5 mL sub-q pen Commonly known as:  TRULICITY  
0.5 mL by SubCUTAneous route every seven (7) days. levETIRAcetam 500 mg tablet Commonly known as:  KEPPRA  
take 1 tablet by mouth twice a day  
  
 lisinopril 20 mg tablet Commonly known as:  Kelley Fair Take 1 Tab by mouth daily. phentermine 37.5 mg tablet Commonly known as:  ADIPEX-P Take 1 Tab by mouth every morning. Max Daily Amount: 37.5 mg. PROAIR HFA 90 mcg/actuation inhaler Generic drug:  albuterol  
inhale 2 puffs by mouth every 4 hours AS NEEDED for wheezing SITagliptin-metFORMIN 50-1,000 mg Tm24 Commonly known as:  JANUMET XR  
take 2 tablets by mouth once daily with DINNER  
  
 tadalafil 5 mg tablet Commonly known as:  CIALIS Take 1 Tab by mouth as needed. Prescriptions Sent to Pharmacy Refills  
 dulaglutide (TRULICITY) 1.5 CJ/3.9 mL sub-q pen 5 Si.5 mL by SubCUTAneous route every seven (7) days.   
 Class: Normal  
 Pharmacy: [id number] [address], [address] [place] [address] #: [phone] Route: SubCUTAneous Follow-up Instructions Return in about 4 months (around 5/29/2018) for labs 1 week before. Patient Instructions Learning About Diabetes Food Guidelines Your Care Instructions Meal planning is important to manage diabetes. It helps keep your blood sugar at a target level (which you set with your doctor). You don't have to eat special foods. You can eat what your family eats, including sweets once in a while. But you do have to pay attention to how often you eat and how much you eat of certain foods. You may want to work with a dietitian or a certified diabetes educator (CDE) to help you plan meals and snacks. A dietitian or CDE can also help you lose weight if that is one of your goals. What should you know about eating carbs? Managing the amount of carbohydrate (carbs) you eat is an important part of healthy meals when you have diabetes. Carbohydrate is found in many foods. · Learn which foods have carbs. And learn the amounts of carbs in different foods. ¨ Bread, cereal, pasta, and rice have about 15 grams of carbs in a serving. A serving is 1 slice of bread (1 ounce), ½ cup of cooked cereal, or 1/3 cup of cooked pasta or rice. ¨ Fruits have 15 grams of carbs in a serving. A serving is 1 small fresh fruit, such as an apple or orange; ½ of a banana; ½ cup of cooked or canned fruit; ½ cup of fruit juice; 1 cup of melon or raspberries; or 2 tablespoons of dried fruit. ¨ Milk and no-sugar-added yogurt have 15 grams of carbs in a serving. A serving is 1 cup of milk or 2/3 cup of no-sugar-added yogurt. ¨ Starchy vegetables have 15 grams of carbs in a serving. A serving is ½ cup of mashed potatoes or sweet potato; 1 cup winter squash; ½ of a small baked potato; ½ cup of cooked beans; or ½ cup cooked corn or green peas. · Learn how much carbs to eat each day and at each meal. A dietitian or CDE can teach you how to keep track of the amount of carbs you eat. This is called carbohydrate counting. · If you are not sure how to count carbohydrate grams, use the Plate Method to plan meals. It is a good, quick way to make sure that you have a balanced meal. It also helps you spread carbs throughout the day. ¨ Divide your plate by types of foods. Put non-starchy vegetables on half the plate, meat or other protein food on one-quarter of the plate, and a grain or starchy vegetable in the final quarter of the plate. To this you can add a small piece of fruit and 1 cup of milk or yogurt, depending on how many carbs you are supposed to eat at a meal. 
· Try to eat about the same amount of carbs at each meal. Do not \"save up\" your daily allowance of carbs to eat at one meal. 
· Proteins have very little or no carbs per serving. Examples of proteins are beef, chicken, turkey, fish, eggs, tofu, cheese, cottage cheese, and peanut butter. A serving size of meat is 3 ounces, which is about the size of a deck of cards. Examples of meat substitute serving sizes (equal to 1 ounce of meat) are 1/4 cup of cottage cheese, 1 egg, 1 tablespoon of peanut butter, and ½ cup of tofu. How can you eat out and still eat healthy? · Learn to estimate the serving sizes of foods that have carbohydrate. If you measure food at home, it will be easier to estimate the amount in a serving of restaurant food. · If the meal you order has too much carbohydrate (such as potatoes, corn, or baked beans), ask to have a low-carbohydrate food instead. Ask for a salad or green vegetables. · If you use insulin, check your blood sugar before and after eating out to help you plan how much to eat in the future. · If you eat more carbohydrate at a meal than you had planned, take a walk or do other exercise. This will help lower your blood sugar. What else should you know? · Limit saturated fat, such as the fat from meat and dairy products. This is a healthy choice because people who have diabetes are at higher risk of heart disease. So choose lean cuts of meat and nonfat or low-fat dairy products. Use olive or canola oil instead of butter or shortening when cooking. · Don't skip meals. Your blood sugar may drop too low if you skip meals and take insulin or certain medicines for diabetes. · Check with your doctor before you drink alcohol. Alcohol can cause your blood sugar to drop too low. Alcohol can also cause a bad reaction if you take certain diabetes medicines. Follow-up care is a key part of your treatment and safety. Be sure to make and go to all appointments, and call your doctor if you are having problems. It's also a good idea to know your test results and keep a list of the medicines you take. Where can you learn more? Go to http://enoc-gary.info/. Enter X948 in the search box to learn more about \"Learning About Diabetes Food Guidelines. \" Current as of: March 13, 2017 Content Version: 11.4 © 0127-6942 [employer]. Care instructions adapted under license by [email or web address] (which disclaims liability or warranty for this information). If you have questions about a medical condition or this instruction, always ask your healthcare professional. [address] any warranty or liability for your use of this information. Introducing [facility]! [facility] introduces [employer] patient portal. Now you can access parts of your medical record, email your doctor's office, and request medication refills online. 1. In your internet browser, go to [email or web address] 2. Click on the First Time User? Click Here link in the Sign In box. You will see the New Member Sign Up page. 3. Enter your [employer] Access Code exactly as it appears below.  You will not need to use this code after youve completed the sign-up process. If you do not sign up before the expiration date, you must request a new code. · [employer] Access Code: [email or web address] Expires: 4/29/2018  1:02 PM 
 
4. Enter the last four digits of your Social Security Number (xxxx) and Date of Birth (mm/dd/yyyy) as indicated and click Submit. You will be taken to the next sign-up page. 5. Create a [employer] ID. This will be your [employer] login ID and cannot be changed, so think of one that is secure and easy to remember. 6. Create a [employer] password. You can change your password at any time. 7. Enter your Password Reset Question and Answer. This can be used at a later time if you forget your password. 8. Enter your e-mail address. You will receive e-mail notification when new information is available in [address]. 9. Click Sign Up. You can now view and download portions of your medical record. 10. Click the Download Summary menu link to download a portable copy of your medical information. If you have questions, please visit the Frequently Asked Questions section of the [employer] website. Remember, [employer] is NOT to be used for urgent needs. For medical emergencies, dial 911. Now available from your iPhone and Android! Please provide this summary of care documentation to your next provider. Your primary care clinician is listed as ILAN FISHER. If you have any questions after today's visit, please call [phone]. [No Acute Distress] : no acute distress [Well Developed] : well developed [Well Nourished] : well nourished [Well-Appearing] : well-appearing [Normal Sclera/Conjunctiva] : normal sclera/conjunctiva [EOMI] : extraocular movements intact [PERRL] : pupils equal round and reactive to light [Normal Outer Ear/Nose] : the outer ears and nose were normal in appearance [No JVD] : no jugular venous distention [Normal Oropharynx] : the oropharynx was normal [No Lymphadenopathy] : no lymphadenopathy [Thyroid Normal, No Nodules] : the thyroid was normal and there were no nodules present [Supple] : supple [No Respiratory Distress] : no respiratory distress  [No Accessory Muscle Use] : no accessory muscle use [Clear to Auscultation] : lungs were clear to auscultation bilaterally [Normal Rate] : normal rate  [Regular Rhythm] : with a regular rhythm [Normal S1, S2] : normal S1 and S2 [No Murmur] : no murmur heard [No Carotid Bruits] : no carotid bruits [No Varicosities] : no varicosities [No Abdominal Bruit] : a ~M bruit was not heard ~T in the abdomen [Pedal Pulses Present] : the pedal pulses are present [No Edema] : there was no peripheral edema [No Palpable Aorta] : no palpable aorta [Soft] : abdomen soft [No Extremity Clubbing/Cyanosis] : no extremity clubbing/cyanosis [Non Tender] : non-tender [No Masses] : no abdominal mass palpated [Non-distended] : non-distended [Normal Bowel Sounds] : normal bowel sounds [No HSM] : no HSM [Normal Posterior Cervical Nodes] : no posterior cervical lymphadenopathy [Normal Anterior Cervical Nodes] : no anterior cervical lymphadenopathy [No CVA Tenderness] : no CVA  tenderness [No Spinal Tenderness] : no spinal tenderness [No Joint Swelling] : no joint swelling [Grossly Normal Strength/Tone] : grossly normal strength/tone [No Rash] : no rash [Coordination Grossly Intact] : coordination grossly intact [Normal Gait] : normal gait [No Focal Deficits] : no focal deficits [Normal Affect] : the affect was normal [Deep Tendon Reflexes (DTR)] : deep tendon reflexes were 2+ and symmetric [Normal Insight/Judgement] : insight and judgment were intact

## 2020-03-16 ENCOUNTER — HOSPITAL ENCOUNTER (OUTPATIENT)
Dept: LAB | Age: 55
Discharge: HOME | End: 2020-03-16
Payer: COMMERCIAL

## 2020-03-16 DIAGNOSIS — R80.9 CONTROLLED TYPE 2 DIABETES MELLITUS WITH MICROALBUMINURIA, WITHOUT LONG-TERM CURRENT USE OF INSULIN (HCC): ICD-10-CM

## 2020-03-16 DIAGNOSIS — E55.9 VITAMIN D DEFICIENCY: ICD-10-CM

## 2020-03-16 DIAGNOSIS — E78.00 HIGH CHOLESTEROL: ICD-10-CM

## 2020-03-16 DIAGNOSIS — I10 ESSENTIAL HYPERTENSION: ICD-10-CM

## 2020-03-16 DIAGNOSIS — E11.29 CONTROLLED TYPE 2 DIABETES MELLITUS WITH MICROALBUMINURIA, WITHOUT LONG-TERM CURRENT USE OF INSULIN (HCC): ICD-10-CM

## 2020-03-16 LAB
25(OH)D3 SERPL-MCNC: 26.1 NG/ML (ref 30–100)
ALBUMIN SERPL-MCNC: 3.4 G/DL (ref 3.4–5)
ALBUMIN/GLOB SERPL: 1.1 {RATIO} (ref 0.8–1.7)
ALP SERPL-CCNC: 100 U/L (ref 45–117)
ALT SERPL-CCNC: 57 U/L (ref 16–61)
ANION GAP SERPL CALC-SCNC: 6 MMOL/L (ref 3–18)
AST SERPL-CCNC: 28 U/L (ref 10–38)
BILIRUB SERPL-MCNC: 0.4 MG/DL (ref 0.2–1)
BUN SERPL-MCNC: 11 MG/DL (ref 7–18)
BUN/CREAT SERPL: 14 (ref 12–20)
CALCIUM SERPL-MCNC: 8.6 MG/DL (ref 8.5–10.1)
CHLORIDE SERPL-SCNC: 101 MMOL/L (ref 100–111)
CHOLEST SERPL-MCNC: 138 MG/DL
CO2 SERPL-SCNC: 27 MMOL/L (ref 21–32)
CREAT SERPL-MCNC: 0.78 MG/DL (ref 0.6–1.3)
GLOBULIN SER CALC-MCNC: 3.2 G/DL (ref 2–4)
GLUCOSE SERPL-MCNC: 102 MG/DL (ref 74–99)
HBA1C MFR BLD: 7.6 % (ref 4.2–5.6)
HDLC SERPL-MCNC: 33 MG/DL (ref 40–60)
HDLC SERPL: 4.2 {RATIO} (ref 0–5)
LDLC SERPL CALC-MCNC: 75.6 MG/DL (ref 0–100)
LIPID PROFILE,FLP: ABNORMAL
POTASSIUM SERPL-SCNC: 5.2 MMOL/L (ref 3.5–5.5)
PROT SERPL-MCNC: 6.6 G/DL (ref 6.4–8.2)
SODIUM SERPL-SCNC: 134 MMOL/L (ref 136–145)
TRIGL SERPL-MCNC: 147 MG/DL (ref ?–150)
VLDLC SERPL CALC-MCNC: 29.4 MG/DL

## 2020-03-16 PROCEDURE — 36415 COLL VENOUS BLD VENIPUNCTURE: CPT

## 2020-03-16 PROCEDURE — 82306 VITAMIN D 25 HYDROXY: CPT

## 2020-03-16 PROCEDURE — 83036 HEMOGLOBIN GLYCOSYLATED A1C: CPT

## 2020-03-16 PROCEDURE — 80061 LIPID PANEL: CPT

## 2020-03-16 PROCEDURE — 80053 COMPREHEN METABOLIC PANEL: CPT

## 2020-03-27 ENCOUNTER — TELEPHONE (OUTPATIENT)
Dept: FAMILY MEDICINE CLINIC | Age: 55
End: 2020-03-27

## 2020-03-27 NOTE — TELEPHONE ENCOUNTER
Left message for patient to call the office and reschedule his appointment if he has any of the following signs or symptoms SOB/fever/chills or any other symptoms and denies any recent sick exposure or travel.

## 2020-05-29 ENCOUNTER — PATIENT MESSAGE (OUTPATIENT)
Dept: FAMILY MEDICINE CLINIC | Age: 55
End: 2020-05-29

## 2020-06-03 ENCOUNTER — VIRTUAL VISIT (OUTPATIENT)
Dept: INTERNAL MEDICINE CLINIC | Age: 55
End: 2020-06-03

## 2020-06-03 VITALS — WEIGHT: 248 LBS | BODY MASS INDEX: 35.58 KG/M2

## 2020-06-03 DIAGNOSIS — E11.29 CONTROLLED TYPE 2 DIABETES MELLITUS WITH MICROALBUMINURIA, WITHOUT LONG-TERM CURRENT USE OF INSULIN (HCC): Primary | ICD-10-CM

## 2020-06-03 DIAGNOSIS — I10 ESSENTIAL HYPERTENSION: ICD-10-CM

## 2020-06-03 DIAGNOSIS — E55.9 VITAMIN D DEFICIENCY: ICD-10-CM

## 2020-06-03 DIAGNOSIS — R80.9 CONTROLLED TYPE 2 DIABETES MELLITUS WITH MICROALBUMINURIA, WITHOUT LONG-TERM CURRENT USE OF INSULIN (HCC): Primary | ICD-10-CM

## 2020-06-03 DIAGNOSIS — E78.00 HIGH CHOLESTEROL: ICD-10-CM

## 2020-06-03 NOTE — PROGRESS NOTES
Wapancho Tyson [place] 62 y.o. male who was seen by synchronous (real-time) audio-video technology on 06/03/20    Consent:  heand/or his healthcare decision maker is aware that this patient-initiated Telehealth encounter is a billable service, with coverage as determined by her insurance carrier. he is aware that he may receive a bill and has provided verbal consent to proceed: Yes I was in my office while conducting this encounter. The patient was in their home. Subjective:       Chief Complaint  The patient presents for follow up of diabetes, hypertension and high cholesterol. RAMÍREZ Landa is a 62 y.o. male seen for follow up of diabetes. Diandra has hypertension and hyperlipidemia. Diabetes control uncertain, on metformin and Trulicity but pt has lost 30 lbs which should significantly improve his medical consition, hypertension has been well controlled, on  lisinopril 20 mg which is also for his proteinuria, hyperlipidemia well controlled, on Lipitor. Diet and Lifestyle: generally follows a low fat low cholesterol diet, follows a diabetic diet regularly, exercises sporadically    Home BP Monitoring: is not measured at home. Diabetic Review of Systems - home glucose monitoring: is not performed. Other symptoms and concerns: pt is now off Keppra and has not had any recurrent seizures. Pt is taking vit D 97986 units/day for his vit D deficiency. Will check levels with next blood work. Pt continue to smoke tobacco and is trying to quit on his own. He had paranoid thoughts on Chantix. Current Outpatient Medications   Medication Sig    albuterol (PROAIR HFA) 90 mcg/actuation inhaler inhale 2 puffs by mouth every 4 hours if needed for wheezing    dulaglutide (TRULICITY) 1.5 QR/7.7 mL sub-q pen 0.5 mL by SubCUTAneous route every seven (7) days.     atorvastatin (LIPITOR) 40 mg tablet take 1 tablet by mouth once daily    lisinopril (PRINIVIL, ZESTRIL) 20 mg tablet take 1 tablet by mouth once daily    metFORMIN ER (GLUCOPHAGE XR) 750 mg tablet Take 1 Tab by mouth two (2) times a day.  aspirin 81 mg chewable tablet Take 1 Tab by mouth daily.  glucose blood VI test strips (FREESTYLE LITE STRIPS) strip Check blood sugar daily. Dx E11.29    tadalafil (CIALIS) 5 mg tablet Take 1 Tab by mouth as needed.  acetaminophen (TYLENOL) 325 mg tablet Take 325 mg by mouth every four (4) hours as needed for Pain. No current facility-administered medications for this visit. Review of Systems  Respiratory: negative for dyspnea on exertion  Cardiovascular: negative for chest pain    Objective:     Visit Vitals  Wt 248 lb (112.5 kg)   BMI 35.58 kg/m²        General appearance - alert, well appearing, and in no distress        Labs:   Lab Results   Component Value Date/Time    WBC 17.1 (H) 03/30/2015 02:03 PM    HGB 17.1 03/30/2015 02:03 PM    HCT 50.8 03/30/2015 02:03 PM    PLATELET 636 42/30/3406 02:03 PM    MCV 93 03/30/2015 02:03 PM     Lab Results   Component Value Date/Time    Hemoglobin A1c 7.6 (H) 03/16/2020 10:12 AM    Hemoglobin A1c 7.7 (H) 11/11/2019 07:25 AM    Hemoglobin A1c 6.1 (H) 11/27/2018 08:37 AM    Glucose 102 (H) 03/16/2020 10:12 AM    Microalbumin/Creat ratio (mg/g creat) 1,901 (H) 11/11/2019 07:25 AM    Microalbumin,urine random 365.00 (H) 11/11/2019 07:25 AM    LDL, calculated 75.6 03/16/2020 10:12 AM    Creatinine 0.78 03/16/2020 10:12 AM      Lab Results   Component Value Date/Time    Cholesterol, total 138 03/16/2020 10:12 AM    HDL Cholesterol 33 (L) 03/16/2020 10:12 AM    LDL, calculated 75.6 03/16/2020 10:12 AM    Triglyceride 147 03/16/2020 10:12 AM    CHOL/HDL Ratio 4.2 03/16/2020 10:12 AM     Lab Results   Component Value Date/Time    ALT (SGPT) 57 03/16/2020 10:12 AM    Alk.  phosphatase 100 03/16/2020 10:12 AM    Bilirubin, total 0.4 03/16/2020 10:12 AM     Lab Results   Component Value Date/Time    GFR est AA >60 03/16/2020 10:12 AM GFR est non-AA >60 03/16/2020 10:12 AM    Creatinine 0.78 03/16/2020 10:12 AM    BUN 11 03/16/2020 10:12 AM    Sodium 134 (L) 03/16/2020 10:12 AM    Potassium 5.2 03/16/2020 10:12 AM    Chloride 101 03/16/2020 10:12 AM    CO2 27 03/16/2020 10:12 AM      Lab Results   Component Value Date/Time    Glucose 102 (H) 03/16/2020 10:12 AM      Labs:   Lab Results   Component Value Date/Time    Hemoglobin A1c 7.6 (H) 03/16/2020 10:12 AM    Hemoglobin A1c (POC) 9.8 08/30/2017 05:55 PM            Assessment / Plan     Diabetes control uncertain. Will continue  Trulicity 1.5 mg and metformin  mg BID. Patient doing well with weight loss. Will check Hba1c in a few months  Hypertension normally well controlled, on lisinopril 20 mg   Hyperlipidemia well controlled, on Lipitor 40 mg. ICD-10-CM ICD-9-CM    1. Controlled type 2 diabetes mellitus with microalbuminuria, without long-term current use of insulin ([facility]) E11.29 250.40 HEMOGLOBIN A1C W/O EAG    R80.9 791.0    2. Essential hypertension G32 080.3 METABOLIC PANEL, COMPREHENSIVE   3. High cholesterol E78.00 272.0 LIPID PANEL   4. Vitamin D deficiency E55.9 268.9 Pt continues on vit D 32163 units/day. Will check VITAMIN D, 25 HYDROXYsoon                   Diabetic issues reviewed with him: diabetic diet discussed in detail, and low cholesterol diet, weight control and daily exercise discussed. Follow-up and Dispositions    · Return in about 3 months (around 9/3/2020) for labs 1 week before. Reviewed plan of care. Patient has provided input and agrees with goals.

## 2020-07-14 ENCOUNTER — PATIENT MESSAGE (OUTPATIENT)
Dept: INTERNAL MEDICINE CLINIC | Age: 55
End: 2020-07-14

## 2020-07-14 RX ORDER — METFORMIN HYDROCHLORIDE 850 MG/1
850 TABLET ORAL 2 TIMES DAILY
Qty: 180 TAB | Refills: 3 | Status: SHIPPED | OUTPATIENT
Start: 2020-07-14 | End: 2022-07-11

## 2020-07-14 NOTE — TELEPHONE ENCOUNTER
----- Message from ClintonDebbie Sellers sent at 7/14/2020  2:57 PM EDT -----  Regarding: Prescription Question  Contact: [phone]  Im presently subscribed Metformin. I have received a re all notice fr9m [facility] advising me to stop taking this medication. Says the manufacture has recalled it do to toxins.  What do you adviseI do?

## 2020-08-31 ENCOUNTER — APPOINTMENT (OUTPATIENT)
Dept: INTERNAL MEDICINE CLINIC | Age: 55
End: 2020-08-31

## 2020-08-31 DIAGNOSIS — E78.00 HIGH CHOLESTEROL: ICD-10-CM

## 2020-08-31 DIAGNOSIS — E55.9 VITAMIN D DEFICIENCY: ICD-10-CM

## 2020-08-31 DIAGNOSIS — E11.29 CONTROLLED TYPE 2 DIABETES MELLITUS WITH MICROALBUMINURIA, WITHOUT LONG-TERM CURRENT USE OF INSULIN (HCC): ICD-10-CM

## 2020-08-31 DIAGNOSIS — I10 ESSENTIAL HYPERTENSION: ICD-10-CM

## 2020-08-31 DIAGNOSIS — R80.9 CONTROLLED TYPE 2 DIABETES MELLITUS WITH MICROALBUMINURIA, WITHOUT LONG-TERM CURRENT USE OF INSULIN (HCC): ICD-10-CM

## 2020-09-01 LAB
25(OH)D3+25(OH)D2 SERPL-MCNC: 33.8 NG/ML (ref 30–100)
ALBUMIN SERPL-MCNC: 3.8 G/DL (ref 3.8–4.9)
ALBUMIN/GLOB SERPL: 1.5 {RATIO} (ref 1.2–2.2)
ALP SERPL-CCNC: 100 IU/L (ref 39–117)
ALT SERPL-CCNC: 43 IU/L (ref 0–44)
AST SERPL-CCNC: 30 IU/L (ref 0–40)
BILIRUB SERPL-MCNC: 0.2 MG/DL (ref 0–1.2)
BUN SERPL-MCNC: 11 MG/DL (ref 6–24)
BUN/CREAT SERPL: 14 (ref 9–20)
CALCIUM SERPL-MCNC: 9.2 MG/DL (ref 8.7–10.2)
CHLORIDE SERPL-SCNC: 99 MMOL/L (ref 96–106)
CHOLEST SERPL-MCNC: 148 MG/DL (ref 100–199)
CO2 SERPL-SCNC: 23 MMOL/L (ref 20–29)
COMMENT:: ABNORMAL
CREAT SERPL-MCNC: 0.8 MG/DL (ref 0.76–1.27)
GLOBULIN SER CALC-MCNC: 2.5 G/DL (ref 1.5–4.5)
GLUCOSE SERPL-MCNC: 144 MG/DL (ref 65–99)
HBA1C MFR BLD: 7.2 % (ref 4.8–5.6)
HDLC SERPL-MCNC: 32 MG/DL
INTERPRETATION, 910389: NORMAL
LDLC SERPL CALC-MCNC: 82 MG/DL (ref 0–99)
Lab: NORMAL
POTASSIUM SERPL-SCNC: 5 MMOL/L (ref 3.5–5.2)
PROT SERPL-MCNC: 6.3 G/DL (ref 6–8.5)
SODIUM SERPL-SCNC: 136 MMOL/L (ref 134–144)
TRIGL SERPL-MCNC: 203 MG/DL (ref 0–149)
VLDLC SERPL CALC-MCNC: 34 MG/DL (ref 5–40)

## 2020-09-03 ENCOUNTER — OFFICE VISIT (OUTPATIENT)
Dept: INTERNAL MEDICINE CLINIC | Age: 55
End: 2020-09-03

## 2020-09-03 VITALS
BODY MASS INDEX: 38.08 KG/M2 | TEMPERATURE: 98.2 F | OXYGEN SATURATION: 97 % | WEIGHT: 266 LBS | HEIGHT: 70 IN | HEART RATE: 96 BPM | DIASTOLIC BLOOD PRESSURE: 87 MMHG | RESPIRATION RATE: 20 BRPM | SYSTOLIC BLOOD PRESSURE: 151 MMHG

## 2020-09-03 DIAGNOSIS — E11.29 CONTROLLED TYPE 2 DIABETES MELLITUS WITH MICROALBUMINURIA, WITHOUT LONG-TERM CURRENT USE OF INSULIN (HCC): Primary | ICD-10-CM

## 2020-09-03 DIAGNOSIS — E78.00 HIGH CHOLESTEROL: ICD-10-CM

## 2020-09-03 DIAGNOSIS — Z12.5 PROSTATE CANCER SCREENING: ICD-10-CM

## 2020-09-03 DIAGNOSIS — I10 ESSENTIAL HYPERTENSION: ICD-10-CM

## 2020-09-03 DIAGNOSIS — E55.9 VITAMIN D DEFICIENCY: ICD-10-CM

## 2020-09-03 DIAGNOSIS — E66.01 CLASS 2 SEVERE OBESITY DUE TO EXCESS CALORIES WITH SERIOUS COMORBIDITY AND BODY MASS INDEX (BMI) OF 38.0 TO 38.9 IN ADULT (HCC): ICD-10-CM

## 2020-09-03 DIAGNOSIS — Z23 NEEDS FLU SHOT: ICD-10-CM

## 2020-09-03 DIAGNOSIS — R80.9 CONTROLLED TYPE 2 DIABETES MELLITUS WITH MICROALBUMINURIA, WITHOUT LONG-TERM CURRENT USE OF INSULIN (HCC): Primary | ICD-10-CM

## 2020-09-03 DIAGNOSIS — H92.12 DRAINAGE FROM LEFT EAR: ICD-10-CM

## 2020-09-03 NOTE — PATIENT INSTRUCTIONS
Influenza (Flu) Vaccine (Inactivated or Recombinant): What You Need to Know Why get vaccinated? Influenza vaccine can prevent influenza (flu). Flu is a contagious disease that spreads around the [place] every year, usually between October and May. Anyone can get the flu, but it is more dangerous for some people. Infants and young children, people 72years of age and older, pregnant women, and people with certain health conditions or a weakened immune system are at greatest risk of flu complications. Pneumonia, bronchitis, sinus infections and ear infections are examples of flu-related complications. If you have a medical condition, such as heart disease, cancer or diabetes, flu can make it worse. Flu can cause fever and chills, sore throat, muscle aches, fatigue, cough, headache, and runny or stuffy nose. Some people may have vomiting and diarrhea, though this is more common in children than adults. Each year, thousands of people in the [facility] die from flu, and many more are hospitalized. Flu vaccine prevents millions of illnesses and flu-related visits to the doctor each year. Influenza vaccine CDC recommends everyone 10months of age and older get vaccinated every flu season. Children 6 months through 6years of age may need 2 doses during a single flu season. Everyone else needs only 1 dose each flu season. It takes about 2 weeks for protection to develop after vaccination. There are many flu viruses, and they are always changing. Each year a new flu vaccine is made to protect against three or four viruses that are likely to cause disease in the upcoming flu season. Even when the vaccine doesn't exactly match these viruses, it may still provide some protection. Influenza vaccine does not cause flu. Influenza vaccine may be given at the same time as other vaccines. Talk with your health care provider Tell your vaccine provider if the person getting the vaccine: · Has had an allergic reaction after a previous dose of influenza vaccine, or has any severe, life-threatening allergies. · Has ever had Guillain-Barré Syndrome (also called GBS). In some cases, your health care provider may decide to postpone influenza vaccination to a future visit. People with minor illnesses, such as a cold, may be vaccinated. People who are moderately or severely ill should usually wait until they recover before getting influenza vaccine. Your health care provider can give you more information. Risks of a vaccine reaction · Soreness, redness, and swelling where shot is given, fever, muscle aches, and headache can happen after influenza vaccine. · There may be a very small increased risk of Guillain-Barré Syndrome (GBS) after inactivated influenza vaccine (the flu shot). Izola Dieter children who get the flu shot along with pneumococcal vaccine (PCV13), and/or DTaP vaccine at the same time might be slightly more likely to have a seizure caused by fever. Tell your health care provider if a child who is getting flu vaccine has ever had a seizure. People sometimes faint after medical procedures, including vaccination. Tell your provider if you feel dizzy or have vision changes or ringing in the ears. As with any medicine, there is a very remote chance of a vaccine causing a severe allergic reaction, other serious injury, or death. What if there is a serious problem? An allergic reaction could occur after the vaccinated person leaves the clinic. If you see signs of a severe allergic reaction (hives, swelling of the face and throat, difficulty breathing, a fast heartbeat, dizziness, or weakness), call 9-1-1 and get the person to the nearest hospital. 
For other signs that concern you, call your health care provider. Adverse reactions should be reported to the Vaccine Adverse Event Reporting System (VAERS).  Your health care provider will usually file this report, or you can do it yourself. Visit the VAERS website at www.vaers. hhs.gov or call 7-742.306.5178. VAERS is only for reporting reactions, and VAERS staff do not give medical advice. The National Vaccine Injury Compensation Program 
The National Vaccine Injury Compensation Program (VICP) is a federal program that was created to compensate people who may have been injured by certain vaccines. Visit the VICP website at www.[facility].gov/vaccinecompensation or call 0-844.959.3009 to learn about the program and about filing a claim. There is a time limit to file a claim for compensation. How can I learn more? · Ask your healthcare provider. · Call your local or state health department. · Contact the Centers for Disease Control and Prevention (CDC): 
? Call 2-855.110.9885 (1-800-CDC-INFO) or 
? Visit CDC's website at www.cdc.gov/flu Vaccine Information Statement (Interim) Inactivated Influenza Vaccine 8/15/2019 
42 JOHNATHAN Maria [id number] [facility] and [employer] Centers for Disease Control and Prevention Many Vaccine Information Statements are available in Filipino and other languages. See www.immunize.org/vis. Muchas hojas de información sobre vacunas están disponibles en español y en otros idiomas. Visite www.immunize.org/vis. Care instructions adapted under license by [employer] (which disclaims liability or warranty for this information). If you have questions about a medical condition or this instruction, always ask your healthcare professional. [place] [address] any warranty or liability for your use of this information.

## 2020-09-03 NOTE — PROGRESS NOTES
Subjective:       Chief Complaint  The patient presents for follow up of diabetes, hypertension and high cholesterol. RAMÍREZ Smith is a 60 y.o. male seen for follow up of diabetes. Healso has hypertension and hyperlipidemia. Diabetes borderline controlled, on metformin, and Trulicity, pt has been having difficulty losing weight again, hypertension borderline controlled, on  lisinopril 20 mg which is also for his proteinuria, hyperlipidemia well controlled, on Lipitor 40 mg. Diet and Lifestyle: not attempting to follow a low fat, low cholesterol diet, does not rigorously follow a diabetic diet, exercises sporadically    Home BP Monitoring: is not measured at home. Diabetic Review of Systems - home glucose monitoring: is performed sporadically     Other symptoms and concerns: pt is now off Keppra and has not had any recurrent seizures. Pt is taking vit D 5000 units/day for his vit D deficiency with good control. Pt continue to smoke tobacco and is trying to quit on his own. He had paranoid thoughts on Chantix. Patient has a cyst underneath his eye for which she will follow-up with Dr. Fernando Murrieta to have removal as well as regular eye appointment    Patient has been noticing drainage from his left ear for several weeks. He has a history of recurrent ear infections. He denies any pain or fevers. Current Outpatient Medications   Medication Sig    metFORMIN (GLUCOPHAGE) 850 mg tablet Take 1 Tab by mouth two (2) times a day.  albuterol (PROAIR HFA) 90 mcg/actuation inhaler inhale 2 puffs by mouth every 4 hours if needed for wheezing    dulaglutide (TRULICITY) 1.5 VS/8.3 mL sub-q pen 0.5 mL by SubCUTAneous route every seven (7) days.  atorvastatin (LIPITOR) 40 mg tablet take 1 tablet by mouth once daily    lisinopril (PRINIVIL, ZESTRIL) 20 mg tablet take 1 tablet by mouth once daily    aspirin 81 mg chewable tablet Take 1 Tab by mouth daily.     glucose blood VI test strips (FREESTYLE LITE STRIPS) strip Check blood sugar daily. Dx E11.29    tadalafil (CIALIS) 5 mg tablet Take 1 Tab by mouth as needed.  acetaminophen (TYLENOL) 325 mg tablet Take 325 mg by mouth every four (4) hours as needed for Pain. No current facility-administered medications for this visit. Review of Systems  Respiratory: negative for dyspnea on exertion  Cardiovascular: negative for chest pain    Objective:     Visit Vitals  /87 (BP 1 Location: Left arm, BP Patient Position: Sitting)   Pulse 96   Temp 98.2 °F (36.8 °C) (Temporal)   Resp 20   Ht 5' 10\" (1.778 m)   Wt 266 lb (120.7 kg)   SpO2 97%   BMI 38.17 kg/m²        General appearance - alert, well appearing, and in no distress  Chest - bilateral wheezes   Heart - normal rate, regular rhythm, normal S1, S2, no murmurs, rubs, clicks or gallops  Extremities - peripheral pulses normal, no pedal edema, no clubbing or cyanosis  HEENT patient with possible hole in his tympanic membrane with yellowish drainage.     Labs:   Lab Results   Component Value Date/Time    WBC 17.1 (H) 03/30/2015 02:03 PM    HGB 17.1 03/30/2015 02:03 PM    HCT 50.8 03/30/2015 02:03 PM    PLATELET 144 [birth year]/1972 02:03 PM    MCV 93 03/30/2015 02:03 PM     Lab Results   Component Value Date/Time    Hemoglobin A1c 7.2 (H) 08/31/2020 08:11 AM    Hemoglobin A1c 7.6 (H) 03/16/2020 10:12 AM    Hemoglobin A1c 7.7 (H) 11/11/2019 07:25 AM    Glucose 144 (H) 08/31/2020 08:11 AM    Microalbumin/Creat ratio (mg/g creat) 1,901 (H) 11/11/2019 07:25 AM    Microalbumin,urine random 365.00 (H) 11/11/2019 07:25 AM    LDL, calculated 75.6 03/16/2020 10:12 AM    Creatinine 0.80 08/31/2020 08:11 AM      Lab Results   Component Value Date/Time    Cholesterol, total 148 08/31/2020 08:11 AM    HDL Cholesterol 32 (L) 08/31/2020 08:11 AM    LDL, calculated 75.6 03/16/2020 10:12 AM    Triglyceride 203 (H) 08/31/2020 08:11 AM    CHOL/HDL Ratio 4.2 03/16/2020 10:12 AM     Lab Results Component Value Date/Time    ALT (SGPT) 43 08/31/2020 08:11 AM    Alk. phosphatase 100 08/31/2020 08:11 AM    Bilirubin, total 0.2 08/31/2020 08:11 AM     Lab Results   Component Value Date/Time    GFR est  08/31/2020 08:11 AM    GFR est non-AA 99 08/31/2020 08:11 AM    Creatinine 0.80 08/31/2020 08:11 AM    BUN 11 08/31/2020 08:11 AM    Sodium 136 08/31/2020 08:11 AM    Potassium 5.0 08/31/2020 08:11 AM    Chloride 99 08/31/2020 08:11 AM    CO2 23 08/31/2020 08:11 AM      Lab Results   Component Value Date/Time    Glucose 144 (H) 08/31/2020 08:11 AM      Labs:   Lab Results   Component Value Date/Time    Hemoglobin A1c 7.2 (H) 08/31/2020 08:11 AM    Hemoglobin A1c (POC) 9.8 08/30/2017 05:55 PM            Assessment / Plan     Diabetes borderline controlled. Will continue  Trulicity 1.5 mg and change to [id number] mg BID. Patient will try to lose weight again  Hypertension borderline controlled, on lisinopril 20 mg. If unable to lose weight will need to adjust meds   Hyperlipidemia well controlled, on Lipitor 40 mg. ICD-10-CM ICD-9-CM    1. Controlled type 2 diabetes mellitus with microalbuminuria, without long-term current use of insulin ([facility])  E11.29 250.40 HEMOGLOBIN A1C W/O EAG    R80.9 791.0 MICROALBUMIN, UR, RAND W/ MICROALB/CREAT RATIO   2. Essential hypertension  E42 785.9 METABOLIC PANEL, COMPREHENSIVE   3. High cholesterol  E78.00 272.0 LIPID PANEL   4. Class 2 severe obesity due to excess calories with serious comorbidity and body mass index (BMI) of 38.0 to 38.9 in adult [facility])  E66.01 278.01  patient will continue to work on trying to cut back starches and sweets in his diet    Z68.38 V85.38    5. Drainage from left ear  H92.12 388.60 REFERRAL TO ENT-OTOLARYNGOLOGY. Pt wanted to hold off on antibiotics at this time. 6. Needs flu shot  Z23 V04.81 INFLUENZA VIRUS VAC QUAD,SPLIT,PRESV FREE SYRINGE IM   7.  Vitamin D deficiency  E55.9 268.9  well-controlled on vitamin D 5000 units/day vITAMIN D, 25 HYDROXY   8. Prostate cancer screening  Z12.5 V76.44 PSA, DIAGNOSTIC (PROSTATE SPECIFIC AG)              Medication instructions and potential side effects reviewed with patient in detail. Diabetic issues reviewed with him: diabetic diet discussed in detail, and low cholesterol diet, weight control and daily exercise discussed. Follow-up and Dispositions    · Return in about 4 months (around 1/3/2021) for labs 1 week before. Reviewed plan of care. Patient has provided input and agrees with goals.

## 2020-09-03 NOTE — PROGRESS NOTES
Patient is in the office today for a 3 month follow up. 1. Have you been to the ER, urgent care clinic since your last visit? Hospitalized since your last visit? No    2. Have you seen or consulted any other health care providers outside of the [address] [place] [address] since your last visit? Include any pap smears or colon screening. no      Verbal order read back per Dr. Tamiko Carpenter flu vaccine. Patient received flu vaccine in right deltoid. Patient was observed and no signs or symptoms of allergic reaction noted at this time. Patient tolerated well and left without complaints. Patient received flu VIS.

## 2020-09-06 DIAGNOSIS — N52.2 DRUG-INDUCED ERECTILE DYSFUNCTION: ICD-10-CM

## 2020-09-08 RX ORDER — TADALAFIL 5 MG/1
5 TABLET ORAL
Qty: 30 TAB | Refills: 5 | Status: SHIPPED | OUTPATIENT
Start: 2020-09-08

## 2020-09-08 RX ORDER — BLOOD-GLUCOSE METER
KIT MISCELLANEOUS
Qty: 100 STRIP | Refills: 3 | Status: SHIPPED | OUTPATIENT
Start: 2020-09-08

## 2020-09-08 NOTE — TELEPHONE ENCOUNTER
Last Visit: 9/3/20 with MD Camacho  Next Appointment: 1/15/21 with MD Camacho  Previous Refill Encounter(s): 8/25/16 Cialis #30 with 5 refills, 3/7/18 Strips #100 with 3 refills    Requested Prescriptions     Pending Prescriptions Disp Refills    tadalafiL (Cialis) 5 mg tablet 30 Tab 5     Sig: Take 1 Tab by mouth as needed.  glucose blood VI test strips (FreeStyle Lite Strips) strip 100 Strip 3     Sig: Check blood sugar daily. Dx E11.29

## 2020-09-22 RX ORDER — ALBUTEROL SULFATE 90 UG/1
AEROSOL, METERED RESPIRATORY (INHALATION)
Qty: 1 INHALER | Refills: 5 | Status: SHIPPED | OUTPATIENT
Start: 2020-09-22 | End: 2021-02-04 | Stop reason: ALTCHOICE

## 2020-10-26 RX ORDER — ALBUTEROL SULFATE 90 UG/1
AEROSOL, METERED RESPIRATORY (INHALATION)
Qty: 1 INHALER | Refills: 5 | Status: CANCELLED | OUTPATIENT
Start: 2020-10-26

## 2020-11-06 RX ORDER — ATORVASTATIN CALCIUM 40 MG/1
TABLET, FILM COATED ORAL
Qty: 90 TAB | Refills: 3 | Status: SHIPPED | OUTPATIENT
Start: 2020-11-06 | End: 2021-11-24

## 2020-11-06 RX ORDER — LISINOPRIL 20 MG/1
TABLET ORAL
Qty: 90 TAB | Refills: 3 | Status: SHIPPED | OUTPATIENT
Start: 2020-11-06 | End: 2021-11-24

## 2021-02-04 ENCOUNTER — PATIENT MESSAGE (OUTPATIENT)
Dept: INTERNAL MEDICINE CLINIC | Age: 56
End: 2021-02-04

## 2021-02-04 RX ORDER — ALBUTEROL SULFATE 90 UG/1
2 AEROSOL, METERED RESPIRATORY (INHALATION)
Qty: 3 INHALER | Refills: 3 | Status: SHIPPED | OUTPATIENT
Start: 2021-02-04 | End: 2021-12-20 | Stop reason: SDUPTHER

## 2021-02-04 NOTE — TELEPHONE ENCOUNTER
----- Message from [facility] sent at 2/4/2021 11:53 AM EST -----  Regarding: Prescription Question  Contact: [phone]  My prescription coverage has changed. Its now covered by [employer]. [employer] does not cover myproair inhaler. They do cover  Albuterol. They said if you state I need the proair it can still be processed with your statement. If not can it be changed to Albuterol?

## 2021-04-20 RX ORDER — ATORVASTATIN CALCIUM 40 MG/1
TABLET, FILM COATED ORAL
Qty: 90 TAB | Refills: 3 | Status: CANCELLED | OUTPATIENT
Start: 2021-04-20

## 2021-04-20 RX ORDER — LISINOPRIL 20 MG/1
TABLET ORAL
Qty: 90 TAB | Refills: 3 | Status: CANCELLED | OUTPATIENT
Start: 2021-04-20

## 2021-04-22 NOTE — TELEPHONE ENCOUNTER
Patient still has refills at the pharmacy for Lipitor. I contacted the pharmacy to confirm and requested they refill it for the patient. No further action needed.

## 2021-04-22 NOTE — TELEPHONE ENCOUNTER
Patient still has refills at the pharmacy for Prinivil. I contacted the pharmacy to confirm and requested they refill it for the patient. No further action needed.

## 2021-08-17 ENCOUNTER — TRANSCRIBE ORDER (OUTPATIENT)
Dept: SCHEDULING | Age: 56
End: 2021-08-17

## 2021-08-17 DIAGNOSIS — H92.11 DRAINAGE FROM EAR, RIGHT: Primary | ICD-10-CM

## 2021-12-21 RX ORDER — DULAGLUTIDE 1.5 MG/.5ML
INJECTION, SOLUTION SUBCUTANEOUS
Qty: 12 ML | Refills: 0 | Status: SHIPPED | OUTPATIENT
Start: 2021-12-21 | End: 2022-06-23

## 2021-12-27 RX ORDER — ATORVASTATIN CALCIUM 40 MG/1
40 TABLET, FILM COATED ORAL DAILY
Qty: 90 TABLET | Refills: 0 | Status: SHIPPED | OUTPATIENT
Start: 2021-12-27 | End: 2022-06-19

## 2021-12-27 RX ORDER — LISINOPRIL 20 MG/1
20 TABLET ORAL DAILY
Qty: 90 TABLET | Refills: 0 | Status: SHIPPED | OUTPATIENT
Start: 2021-12-27 | End: 2022-01-18 | Stop reason: DRUGHIGH

## 2021-12-27 NOTE — TELEPHONE ENCOUNTER
Neville Annia is requesting a 90 day supply  Previous Rx's were sent to [facility]    Last Visit: 9/3/20 with MD Camacho  Next Appointment: 1/18/22 with MD Camacho    Requested Prescriptions     Pending Prescriptions Disp Refills    atorvastatin (LIPITOR) 40 mg tablet 90 Tablet 0     Sig: Take 1 Tablet by mouth daily.  lisinopriL (PRINIVIL, ZESTRIL) 20 mg tablet 90 Tablet 0     Sig: Take 1 Tablet by mouth daily. Abnormal Fetal Heart Rate Category II

## 2022-01-10 ENCOUNTER — LAB ONLY (OUTPATIENT)
Dept: INTERNAL MEDICINE CLINIC | Age: 57
End: 2022-01-10

## 2022-01-10 ENCOUNTER — HOSPITAL ENCOUNTER (OUTPATIENT)
Dept: LAB | Age: 57
Discharge: HOME | End: 2022-01-10
Payer: COMMERCIAL

## 2022-01-10 DIAGNOSIS — E11.29 CONTROLLED TYPE 2 DIABETES MELLITUS WITH MICROALBUMINURIA, WITHOUT LONG-TERM CURRENT USE OF INSULIN (HCC): Primary | ICD-10-CM

## 2022-01-10 DIAGNOSIS — I10 ESSENTIAL HYPERTENSION: ICD-10-CM

## 2022-01-10 DIAGNOSIS — E11.29 CONTROLLED TYPE 2 DIABETES MELLITUS WITH MICROALBUMINURIA, WITHOUT LONG-TERM CURRENT USE OF INSULIN (HCC): ICD-10-CM

## 2022-01-10 DIAGNOSIS — Z12.5 SCREENING PSA (PROSTATE SPECIFIC ANTIGEN): ICD-10-CM

## 2022-01-10 DIAGNOSIS — R80.9 CONTROLLED TYPE 2 DIABETES MELLITUS WITH MICROALBUMINURIA, WITHOUT LONG-TERM CURRENT USE OF INSULIN (HCC): ICD-10-CM

## 2022-01-10 DIAGNOSIS — E78.00 HIGH CHOLESTEROL: ICD-10-CM

## 2022-01-10 DIAGNOSIS — R80.9 CONTROLLED TYPE 2 DIABETES MELLITUS WITH MICROALBUMINURIA, WITHOUT LONG-TERM CURRENT USE OF INSULIN (HCC): Primary | ICD-10-CM

## 2022-01-10 LAB
ALBUMIN SERPL-MCNC: 3.6 G/DL (ref 3.4–5)
ALBUMIN/GLOB SERPL: 1.2 {RATIO} (ref 0.8–1.7)
ALP SERPL-CCNC: 109 U/L (ref 45–117)
ALT SERPL-CCNC: 54 U/L (ref 16–61)
ANION GAP SERPL CALC-SCNC: 6 MMOL/L (ref 3–18)
AST SERPL-CCNC: 24 U/L (ref 10–38)
BILIRUB SERPL-MCNC: 0.6 MG/DL (ref 0.2–1)
BUN SERPL-MCNC: 12 MG/DL (ref 7–18)
BUN/CREAT SERPL: 16 (ref 12–20)
CALCIUM SERPL-MCNC: 9.5 MG/DL (ref 8.5–10.1)
CHLORIDE SERPL-SCNC: 102 MMOL/L (ref 100–111)
CHOLEST SERPL-MCNC: 113 MG/DL
CO2 SERPL-SCNC: 28 MMOL/L (ref 21–32)
CREAT SERPL-MCNC: 0.77 MG/DL (ref 0.6–1.3)
CREAT UR-MCNC: 139 MG/DL (ref 30–125)
GLOBULIN SER CALC-MCNC: 3.1 G/DL (ref 2–4)
GLUCOSE SERPL-MCNC: 94 MG/DL (ref 74–99)
HBA1C MFR BLD: 7.1 % (ref 4.2–5.6)
HDLC SERPL-MCNC: 35 MG/DL (ref 40–60)
HDLC SERPL: 3.2 {RATIO} (ref 0–5)
LDLC SERPL CALC-MCNC: 53.8 MG/DL (ref 0–100)
LIPID PROFILE,FLP: ABNORMAL
MICROALBUMIN UR-MCNC: 221 MG/DL (ref 0–3)
MICROALBUMIN/CREAT UR-RTO: 1590 MG/G (ref 0–30)
POTASSIUM SERPL-SCNC: 4.7 MMOL/L (ref 3.5–5.5)
PROT SERPL-MCNC: 6.7 G/DL (ref 6.4–8.2)
PSA SERPL-MCNC: 0.4 NG/ML (ref 0–4)
SODIUM SERPL-SCNC: 136 MMOL/L (ref 136–145)
TRIGL SERPL-MCNC: 121 MG/DL (ref ?–150)
VLDLC SERPL CALC-MCNC: 24.2 MG/DL

## 2022-01-10 PROCEDURE — 84153 ASSAY OF PSA TOTAL: CPT

## 2022-01-10 PROCEDURE — 82043 UR ALBUMIN QUANTITATIVE: CPT

## 2022-01-10 PROCEDURE — 83036 HEMOGLOBIN GLYCOSYLATED A1C: CPT

## 2022-01-10 PROCEDURE — 36415 COLL VENOUS BLD VENIPUNCTURE: CPT

## 2022-01-10 PROCEDURE — 80061 LIPID PANEL: CPT

## 2022-01-10 PROCEDURE — 80053 COMPREHEN METABOLIC PANEL: CPT

## 2022-01-18 ENCOUNTER — OFFICE VISIT (OUTPATIENT)
Dept: INTERNAL MEDICINE CLINIC | Age: 57
End: 2022-01-18
Payer: COMMERCIAL

## 2022-01-18 VITALS
SYSTOLIC BLOOD PRESSURE: 146 MMHG | WEIGHT: 258 LBS | HEART RATE: 98 BPM | HEIGHT: 70 IN | TEMPERATURE: 97.6 F | RESPIRATION RATE: 18 BRPM | OXYGEN SATURATION: 97 % | BODY MASS INDEX: 36.94 KG/M2 | DIASTOLIC BLOOD PRESSURE: 87 MMHG

## 2022-01-18 DIAGNOSIS — I10 ESSENTIAL HYPERTENSION: ICD-10-CM

## 2022-01-18 DIAGNOSIS — E11.29 CONTROLLED TYPE 2 DIABETES MELLITUS WITH MICROALBUMINURIA, WITHOUT LONG-TERM CURRENT USE OF INSULIN (HCC): Primary | ICD-10-CM

## 2022-01-18 DIAGNOSIS — E78.00 HIGH CHOLESTEROL: ICD-10-CM

## 2022-01-18 DIAGNOSIS — Z12.11 SCREEN FOR COLON CANCER: ICD-10-CM

## 2022-01-18 DIAGNOSIS — E66.01 CLASS 2 SEVERE OBESITY DUE TO EXCESS CALORIES WITH SERIOUS COMORBIDITY AND BODY MASS INDEX (BMI) OF 37.0 TO 37.9 IN ADULT (HCC): ICD-10-CM

## 2022-01-18 DIAGNOSIS — R80.9 CONTROLLED TYPE 2 DIABETES MELLITUS WITH MICROALBUMINURIA, WITHOUT LONG-TERM CURRENT USE OF INSULIN (HCC): Primary | ICD-10-CM

## 2022-01-18 PROCEDURE — 3051F HG A1C>EQUAL 7.0%<8.0%: CPT | Performed by: INTERNAL MEDICINE

## 2022-01-18 PROCEDURE — 99214 OFFICE O/P EST MOD 30 MIN: CPT | Performed by: INTERNAL MEDICINE

## 2022-01-18 RX ORDER — ALBUTEROL SULFATE 90 UG/1
2 AEROSOL, METERED RESPIRATORY (INHALATION)
Qty: 1 EACH | Refills: 0 | Status: SHIPPED | OUTPATIENT
Start: 2022-01-18 | End: 2022-04-16 | Stop reason: SDUPTHER

## 2022-01-18 RX ORDER — LISINOPRIL 40 MG/1
40 TABLET ORAL DAILY
Qty: 90 TABLET | Refills: 1 | Status: SHIPPED | OUTPATIENT
Start: 2022-01-18 | End: 2022-07-04

## 2022-01-18 RX ORDER — GABAPENTIN 100 MG/1
100 CAPSULE ORAL DAILY
COMMUNITY
Start: 2022-01-16

## 2022-01-18 NOTE — PATIENT INSTRUCTIONS
Diabetes Foot Health: Care Instructions  Your Care Instructions     When you have diabetes, your feet need extra care and attention. Diabetes can damage the nerve endings and blood vessels in your feet, making you less likely to notice when your feet are injured. Diabetes also limits your body's ability to fight infection and get blood to areas that need it. If you get a minor foot injury, it could become an ulcer or a serious infection. With good foot care, you can prevent most of these problems. Caring for your feet can be quick and easy. Most of the care can be done when you are bathing or getting ready for bed. Follow-up care is a key part of your treatment and safety. Be sure to make and go to all appointments, and call your doctor if you are having problems. It's also a good idea to know your test results and keep a list of the medicines you take. How can you care for yourself at home? · Keep your blood sugar close to normal by watching what and how much you eat, monitoring blood sugar, taking medicines if prescribed, and getting regular exercise. · Do not smoke. Smoking affects blood flow and can make foot problems worse. If you need help quitting, talk to your doctor about stop-smoking programs and medicines. These can increase your chances of quitting for good. · Eat a diet that is low in fats. High fat intake can cause fat to build up in your blood vessels and decrease blood flow. · Inspect your feet daily for blisters, cuts, cracks, or sores. If you cannot see well, use a mirror or have someone help you. · Take care of your feet:  ? Wash your feet every day. Use warm (not hot) water. Check the water temperature with your wrists or other part of your body, not your feet. ? Dry your feet well. Pat them dry. Do not rub the skin on your feet too hard. Dry well between your toes. If the skin on your feet stays moist, bacteria or a fungus can grow, which can lead to infection. ?  Keep your skin soft. Use moisturizing skin cream to keep the skin on your feet soft and prevent calluses and cracks. But do not put the cream between your toes, and stop using any cream that causes a rash. ? Clean underneath your toenails carefully. Do not use a sharp object to clean underneath your toenails. Use the blunt end of a nail file or other rounded tool. ? Trim and file your toenails straight across to prevent ingrown toenails. Use a nail clipper, not scissors. Use an emery board to smooth the edges. · Change socks daily. Socks without seams are best, because seams often rub the feet. You can find socks for people with diabetes from specialty catalogs. · Look inside your shoes every day for things like gravel or torn linings, which could cause blisters or sores. · Buy shoes that fit well:  ? Look for shoes that have plenty of space around the toes. This helps prevent bunions and blisters. ? Try on shoes while wearing the kind of socks you will usually wear with the shoes. ? Avoid plastic shoes. They may rub your feet and cause blisters. Good shoes should be made of materials that are flexible and breathable, such as leather or cloth. ? Break in new shoes slowly by wearing them for no more than an hour a day for several days. Take extra time to check your feet for red areas, blisters, or other problems after you wear new shoes. · Do not go barefoot. Do not wear sandals, and do not wear shoes with very thin soles. Thin soles are easy to puncture. They also do not protect your feet from hot pavement or cold weather. · Have your doctor check your feet during each visit. If you have a foot problem, see your doctor. Do not try to treat an early foot problem at home. Home remedies or treatments that you can buy without a prescription (such as corn removers) can be harmful. · Always get early treatment for foot problems. A minor irritation can lead to a major problem if not properly cared for early.   When should you call for help? Call your doctor now or seek immediate medical care if:    · You have a foot sore, an ulcer or break in the skin that is not healing after 4 days, bleeding corns or calluses, or an ingrown toenail.     · You have blue or black areas, which can mean bruising or blood flow problems.     · You have peeling skin or tiny blisters between your toes or cracking or oozing of the skin.     · You have a fever for more than 24 hours and a foot sore.     · You have new numbness or tingling in your feet that does not go away after you move your feet or change positions.     · You have unexplained or unusual swelling of the foot or ankle. Watch closely for changes in your health, and be sure to contact your doctor if:    · You cannot do proper foot care. Where can you learn more? Go to http://www.gray.com/  Enter A739 in the search box to learn more about \"Diabetes Foot Health: Care Instructions. \"  Current as of: August 31, 2020               Content Version: 13.0  © 2006-2021 [employer]. Care instructions adapted under license by [employer] (which disclaims liability or warranty for this information). If you have questions about a medical condition or this instruction, always ask your healthcare professional. [address] any warranty or liability for your use of this information.

## 2022-01-18 NOTE — PROGRESS NOTES
Subjective:       Chief Complaint  The patient presents for follow up of diabetes, hypertension and high cholesterol. HPI  Mady Shahid is a 60 y.o. male seen for follow up of diabetes. Healso has hypertension and hyperlipidemia. Diabetes well controlled, on metformin, and Trulicity, pt has been having difficulty losing weight again, hypertension uncontrolled, on  lisinopril 20 mg which is also for his proteinuria, hyperlipidemia well controlled, on Lipitor 40 mg. Diet and Lifestyle: not attempting to follow a low fat, low cholesterol diet, does not rigorously follow a diabetic diet, exercises sporadically    Home BP Monitoring: is not measured at home. Diabetic Review of Systems - home glucose monitoring: is performed sporadically     Other symptoms and concerns: pt is now off Keppra and has not had any recurrent seizures. Pt is taking vit D 5000 units/day for his vit D deficiency with good control. Pt continue to smoke tobacco and is trying to quit on his own. He had paranoid thoughts on Chantix. Current Outpatient Medications   Medication Sig    gabapentin (NEURONTIN) 100 mg capsule Prescribed By ENT Dr. Cheri Driscoll albuterol (PROVENTIL HFA, VENTOLIN HFA, PROAIR HFA) 90 mcg/actuation inhaler Take 2 Puffs by inhalation every four (4) hours as needed for Wheezing.  lisinopriL (PRINIVIL, ZESTRIL) 40 mg tablet Take 1 Tablet by mouth daily.  atorvastatin (LIPITOR) 40 mg tablet Take 1 Tablet by mouth daily.  Trulicity 1.5 CU/9.4 mL sub-q pen inject 0.5 milliliters ( 1 AND 1/2 milligrams ) subcutaneously every 7 days IN THE ABDOMEN THIGHS OR OUTER AREA OF UPPER ARM ROTATE INJECTION SITES    tadalafiL (Cialis) 5 mg tablet Take 1 Tab by mouth daily as needed for Erectile Dysfunction.  glucose blood VI test strips (FreeStyle Lite Strips) strip Check blood sugar daily. Dx E11.29    metFORMIN (GLUCOPHAGE) 850 mg tablet Take 1 Tab by mouth two (2) times a day.     aspirin 81 mg chewable tablet Take 1 Tab by mouth daily.  acetaminophen (TYLENOL) 325 mg tablet Take 325 mg by mouth every four (4) hours as needed for Pain. No current facility-administered medications for this visit. Review of Systems  Respiratory: negative for dyspnea on exertion  Cardiovascular: negative for chest pain    Objective:     Visit Vitals  BP (!) 146/87 (BP 1 Location: Left arm, BP Patient Position: Sitting, BP Cuff Size: Adult)   Pulse 98   Temp 97.6 °F (36.4 °C) (Temporal)   Resp 18   Ht 5' 10\" (1.778 m)   Wt 258 lb (117 kg)   SpO2 97%   BMI 37.02 kg/m²        General appearance - alert, well appearing, and in no distress  Chest - bilateral wheezes   Heart - normal rate, regular rhythm, normal S1, S2, no murmurs, rubs, clicks or gallops  Extremities - peripheral pulses normal, no pedal edema, no clubbing or cyanosis      Labs:     Lab Results   Component Value Date/Time    Hemoglobin A1c 7.1 (H) 01/10/2022 11:39 AM    Hemoglobin A1c 7.2 (H) 08/31/2020 08:11 AM    Hemoglobin A1c 7.6 (H) 03/16/2020 10:12 AM    Glucose 94 01/10/2022 11:39 AM    Microalbumin/Creat ratio (mg/g creat) 1,590 (H) 01/10/2022 11:39 AM    Microalbumin,urine random 221.00 (H) 01/10/2022 11:39 AM    LDL, calculated 53.8 01/10/2022 11:39 AM    Creatinine 0.77 01/10/2022 11:39 AM      Lab Results   Component Value Date/Time    Cholesterol, total 113 01/10/2022 11:39 AM    HDL Cholesterol 35 (L) 01/10/2022 11:39 AM    LDL, calculated 53.8 01/10/2022 11:39 AM    Triglyceride 121 01/10/2022 11:39 AM    CHOL/HDL Ratio 3.2 01/10/2022 11:39 AM     Lab Results   Component Value Date/Time    ALT (SGPT) 54 01/10/2022 11:39 AM    Alk.  phosphatase 109 01/10/2022 11:39 AM    Bilirubin, total 0.6 01/10/2022 11:39 AM     Lab Results   Component Value Date/Time    GFR est AA >60 01/10/2022 11:39 AM    GFR est non-AA >60 01/10/2022 11:39 AM    Creatinine 0.77 01/10/2022 11:39 AM    BUN 12 01/10/2022 11:39 AM    Sodium 136 01/10/2022 11:39 AM Potassium 4.7 01/10/2022 11:39 AM    Chloride 102 01/10/2022 11:39 AM    CO2 28 01/10/2022 11:39 AM      Lab Results   Component Value Date/Time    Glucose 94 01/10/2022 11:39 AM      Labs:   Lab Results   Component Value Date/Time    Prostate Specific Ag 0.4 01/10/2022 11:39 AM    Prostate Specific Ag 0.4 11/11/2019 07:25 AM    Prostate Specific Ag 0.3 01/17/2018 07:28 AM       Labs:   Lab Results   Component Value Date/Time    Hemoglobin A1c 7.1 (H) 01/10/2022 11:39 AM    Hemoglobin A1c (POC) 9.8 08/30/2017 05:55 PM            Assessment / Plan     Diabetes fairly well controlled. Will continue  Trulicity 1.5 mg and  Metformin 850 mg BID. Patient will try to lose weight again  Hypertension uncontrolled, will increase to lisinopril 40 mg  Hyperlipidemia well controlled, on Lipitor 40 mg. ICD-10-CM ICD-9-CM    1. Controlled type 2 diabetes mellitus with microalbuminuria, without long-term current use of insulin ([facility])  E11.29 250.40 HEMOGLOBIN A1C W/O EAG    R80.9 791.0 MICROALBUMIN, UR, RAND W/ MICROALB/CREAT RATIO   2. Essential hypertension  R56 287.8 METABOLIC PANEL, COMPREHENSIVE   3. High cholesterol  E78.00 272.0 LIPID PANEL   4. Class 2 severe obesity due to excess calories with serious comorbidity and body mass index (BMI) of 37.0 to 37.9 in adult ([facility]  E66.01 278.01 Pt will continue to work on trying to lose weight by cutting back sugar and carbs in his diet. Z68.37 V85.37    5. Screen for colon cancer  Z12.11 V76.51 REFERRAL TO GASTROENTEROLOGY              . Diabetic issues reviewed with him: diabetic diet discussed in detail, and low cholesterol diet, weight control and daily exercise discussed. Follow-up and Dispositions    · Return in about 3 months (around 4/18/2022) for labs 1 week before. Reviewed plan of care. Patient has provided input and agrees with goals.

## 2022-01-18 NOTE — PROGRESS NOTES
Patient is in the office today for a 6 month follow up. Do you have an Advance Directive no  Do you want more information : information given     1. \"Have you been to the ER, urgent care clinic since your last visit? Hospitalized since your last visit? \" No    2. \"Have you seen or consulted any other health care providers outside of the [address] [place] [address] since your last visit? \" yes, Dr Iris Woo ENT [facility] [place]. 3. For patients aged 39-70: Has the patient had a colonoscopy / FIT/ Cologuard? No      If the patient is female:    4. For patients aged 41-77: Has the patient had a mammogram within the past 2 years? NA - based on age or sex  See top three    5. For patients aged 21-65: Has the patient had a pap smear?  NA - based on age or sex

## 2022-03-01 ENCOUNTER — OFFICE VISIT (OUTPATIENT)
Dept: INTERNAL MEDICINE CLINIC | Age: 57
End: 2022-03-01
Payer: COMMERCIAL

## 2022-03-01 VITALS
TEMPERATURE: 98 F | DIASTOLIC BLOOD PRESSURE: 70 MMHG | HEART RATE: 86 BPM | HEIGHT: 70 IN | OXYGEN SATURATION: 97 % | RESPIRATION RATE: 16 BRPM | WEIGHT: 246 LBS | BODY MASS INDEX: 35.22 KG/M2 | SYSTOLIC BLOOD PRESSURE: 133 MMHG

## 2022-03-01 DIAGNOSIS — L03.119 CELLULITIS OF LOWER EXTREMITY, UNSPECIFIED LATERALITY: Primary | ICD-10-CM

## 2022-03-01 PROCEDURE — 99214 OFFICE O/P EST MOD 30 MIN: CPT | Performed by: INTERNAL MEDICINE

## 2022-03-01 RX ORDER — DOXYCYCLINE 100 MG/1
100 TABLET ORAL 2 TIMES DAILY
Qty: 20 TABLET | Refills: 0 | Status: SHIPPED | OUTPATIENT
Start: 2022-03-01 | End: 2022-03-11

## 2022-03-01 NOTE — PROGRESS NOTES
HISTORY OF PRESENT ILLNESS  Debra Fu is a 60 y.o. male. Pt developed burning and redness on both shins in the last 24 hours and he also had blister on LLE that ruptured. He has h/o LE cellulitis in the past that responded to Doxycyline. He denies any fever or chills. Denies any recent truma to his legs. Skin Problem  The history is provided by the patient. This is a new problem. The current episode started yesterday. The problem occurs constantly. Nothing aggravates the symptoms. Nothing relieves the symptoms. He has tried nothing for the symptoms. Current Outpatient Medications   Medication Sig    doxycycline (ADOXA) 100 mg tablet Take 1 Tablet by mouth two (2) times a day for 10 days.  gabapentin (NEURONTIN) 100 mg capsule Take 100 mg by mouth daily. Prescribed By ENT Dr. Hutchison Median albuterol (PROVENTIL HFA, VENTOLIN HFA, PROAIR HFA) 90 mcg/actuation inhaler Take 2 Puffs by inhalation every four (4) hours as needed for Wheezing.  lisinopriL (PRINIVIL, ZESTRIL) 40 mg tablet Take 1 Tablet by mouth daily.  atorvastatin (LIPITOR) 40 mg tablet Take 1 Tablet by mouth daily.  Trulicity 1.5 GZ/9.5 mL sub-q pen inject 0.5 milliliters ( 1 AND 1/2 milligrams ) subcutaneously every 7 days IN THE ABDOMEN THIGHS OR OUTER AREA OF UPPER ARM ROTATE INJECTION SITES    tadalafiL (Cialis) 5 mg tablet Take 1 Tab by mouth daily as needed for Erectile Dysfunction.  glucose blood VI test strips (FreeStyle Lite Strips) strip Check blood sugar daily. Dx E11.29    metFORMIN (GLUCOPHAGE) 850 mg tablet Take 1 Tab by mouth two (2) times a day.  aspirin 81 mg chewable tablet Take 1 Tab by mouth daily.  acetaminophen (TYLENOL) 325 mg tablet Take 325 mg by mouth every four (4) hours as needed for Pain. No current facility-administered medications for this visit. Review of Systems   Constitutional: Negative for chills and fever. Cardiovascular: Negative for leg swelling.        Physical Exam  Vitals reviewed. Skin:     Findings: Erythema (both LE ) present. Neurological:      Mental Status: He is alert. ASSESSMENT and PLAN    ICD-10-CM ICD-9-CM    1. Cellulitis of lower extremity, unspecified laterality  L03.119 682.6 Will treat with doxycycline (ADOXA) 100 mg tablet BID. Refer to dermatology if not improving      Reviewed plan of care. Patient has provided input and agrees with goals.

## 2022-03-01 NOTE — PROGRESS NOTES
Katina Curiel presents today for   Chief Complaint   Patient presents with    Follow-up    Diabetes     1. \"Have you been to the ER, urgent care clinic since your last visit? Hospitalized since your last visit? \" no    2. \"Have you seen or consulted any other health care providers outside of the [address] [place] [address] since your last visit? \" no     3. For patients aged 39-70: Has the patient had a colonoscopy / FIT/ Cologuard? Yes - Care Gap present. Rooming MA/LPN to request most recent results      If the patient is female:    4. For patients aged 41-77: Has the patient had a mammogram within the past 2 years? NA - based on age or sex  See top three    5. For patients aged 21-65: Has the patient had a pap smear?  NA - based on age or sex

## 2022-03-19 PROBLEM — E66.01 OBESITY, MORBID (HCC): Status: ACTIVE | Noted: 2018-01-29

## 2022-04-15 ENCOUNTER — TELEPHONE (OUTPATIENT)
Dept: INTERNAL MEDICINE CLINIC | Age: 57
End: 2022-04-15

## 2022-04-15 NOTE — TELEPHONE ENCOUNTER
I spoke with patient. His appt with Dr Cyndee Coronel was re-scheduled to 05/02/22    I sent his current lab orders to him via my chart.  He will get labs collected offsite

## 2022-04-15 NOTE — TELEPHONE ENCOUNTER
----- Message from Kirt Denise sent at 4/15/2022  9:01 AM EDT -----  Subject: Message to Provider    QUESTIONS  Information for Provider? patient needs lab appt prior to his 4/18   appt-please call him to schedule-no answer at office  ---------------------------------------------------------------------------  --------------  [address]  What is the best way for the office to contact you? OK to leave message on   voicemail  Preferred Call Back Phone Number? [phone]  ---------------------------------------------------------------------------  --------------  SCRIPT ANSWERS  Relationship to Patient?  Self

## 2022-04-18 RX ORDER — ALBUTEROL SULFATE 90 UG/1
2 AEROSOL, METERED RESPIRATORY (INHALATION)
Qty: 1 EACH | Refills: 5 | Status: SHIPPED | OUTPATIENT
Start: 2022-04-18 | End: 2022-05-02 | Stop reason: SDUPTHER

## 2022-04-18 NOTE — TELEPHONE ENCOUNTER
Last Visit: 3/1/22 with MD Camacho  Next Appointment: 5/2/22 with MD Camacho  Previous Refill Encounter(s): 1/18/22 #1    Requested Prescriptions     Pending Prescriptions Disp Refills    albuterol (PROVENTIL HFA, VENTOLIN HFA, PROAIR HFA) 90 mcg/actuation inhaler 1 Each 5     Sig: Take 2 Puffs by inhalation every four (4) hours as needed for Wheezing.

## 2022-04-27 LAB
ALBUMIN SERPL-MCNC: 3.9 G/DL (ref 3.8–4.9)
ALBUMIN/CREAT UR: 1191 MG/G CREAT (ref 0–29)
ALBUMIN/GLOB SERPL: 1.5 {RATIO} (ref 1.2–2.2)
ALP SERPL-CCNC: 103 IU/L (ref 44–121)
ALT SERPL-CCNC: 46 IU/L (ref 0–44)
AST SERPL-CCNC: 23 IU/L (ref 0–40)
BILIRUB SERPL-MCNC: 0.5 MG/DL (ref 0–1.2)
BUN SERPL-MCNC: 11 MG/DL (ref 6–24)
BUN/CREAT SERPL: 16 (ref 9–20)
CALCIUM SERPL-MCNC: 9.2 MG/DL (ref 8.7–10.2)
CHLORIDE SERPL-SCNC: 100 MMOL/L (ref 96–106)
CHOLEST SERPL-MCNC: 139 MG/DL (ref 100–199)
CO2 SERPL-SCNC: 22 MMOL/L (ref 20–29)
CREAT SERPL-MCNC: 0.69 MG/DL (ref 0.76–1.27)
CREAT UR-MCNC: 167.6 MG/DL
EGFR: 107 ML/MIN/1.73
GLOBULIN SER CALC-MCNC: 2.6 G/DL (ref 1.5–4.5)
GLUCOSE SERPL-MCNC: 114 MG/DL (ref 65–99)
HBA1C MFR BLD: 6.6 % (ref 4.8–5.6)
HDLC SERPL-MCNC: 29 MG/DL
IMP & REVIEW OF LAB RESULTS: NORMAL
LDLC SERPL CALC-MCNC: 85 MG/DL (ref 0–99)
MICROALBUMIN UR-MCNC: 1996 UG/ML
POTASSIUM SERPL-SCNC: 5.1 MMOL/L (ref 3.5–5.2)
PROT SERPL-MCNC: 6.5 G/DL (ref 6–8.5)
SODIUM SERPL-SCNC: 138 MMOL/L (ref 134–144)
SPECIMEN STATUS REPORT, ROLRST: NORMAL
TRIGL SERPL-MCNC: 138 MG/DL (ref 0–149)
VLDLC SERPL CALC-MCNC: 25 MG/DL (ref 5–40)

## 2022-05-02 ENCOUNTER — TELEPHONE (OUTPATIENT)
Dept: INTERNAL MEDICINE CLINIC | Age: 57
End: 2022-05-02

## 2022-05-02 ENCOUNTER — OFFICE VISIT (OUTPATIENT)
Dept: INTERNAL MEDICINE CLINIC | Age: 57
End: 2022-05-02
Payer: COMMERCIAL

## 2022-05-02 VITALS
OXYGEN SATURATION: 98 % | DIASTOLIC BLOOD PRESSURE: 70 MMHG | HEART RATE: 82 BPM | WEIGHT: 244 LBS | BODY MASS INDEX: 34.93 KG/M2 | RESPIRATION RATE: 20 BRPM | TEMPERATURE: 97.4 F | SYSTOLIC BLOOD PRESSURE: 139 MMHG | HEIGHT: 70 IN

## 2022-05-02 DIAGNOSIS — E66.01 CLASS 2 SEVERE OBESITY DUE TO EXCESS CALORIES WITH SERIOUS COMORBIDITY AND BODY MASS INDEX (BMI) OF 35.0 TO 35.9 IN ADULT (HCC): ICD-10-CM

## 2022-05-02 DIAGNOSIS — R80.9 CONTROLLED TYPE 2 DIABETES MELLITUS WITH MICROALBUMINURIA, WITHOUT LONG-TERM CURRENT USE OF INSULIN (HCC): Primary | ICD-10-CM

## 2022-05-02 DIAGNOSIS — E11.29 CONTROLLED TYPE 2 DIABETES MELLITUS WITH MICROALBUMINURIA, WITHOUT LONG-TERM CURRENT USE OF INSULIN (HCC): Primary | ICD-10-CM

## 2022-05-02 DIAGNOSIS — I10 ESSENTIAL HYPERTENSION: ICD-10-CM

## 2022-05-02 DIAGNOSIS — E78.00 HIGH CHOLESTEROL: ICD-10-CM

## 2022-05-02 PROCEDURE — 3044F HG A1C LEVEL LT 7.0%: CPT | Performed by: INTERNAL MEDICINE

## 2022-05-02 PROCEDURE — 99214 OFFICE O/P EST MOD 30 MIN: CPT | Performed by: INTERNAL MEDICINE

## 2022-05-02 RX ORDER — ALBUTEROL SULFATE 90 UG/1
2 AEROSOL, METERED RESPIRATORY (INHALATION)
Qty: 3 EACH | Refills: 3 | Status: SHIPPED | OUTPATIENT
Start: 2022-05-02

## 2022-05-02 NOTE — PROGRESS NOTES
Patient is in the office today for a 4 month follow up. Do you have an Advance Directive no  Do you want more information : information given     1. \"Have you been to the ER, urgent care clinic since your last visit? Hospitalized since your last visit? \" No    2. \"Have you seen or consulted any other health care providers outside of the [address] [place] [address] since your last visit? \" No     3. For patients aged 39-70: Has the patient had a colonoscopy / FIT/ Cologuard? No      If the patient is female:    4. For patients aged 41-77: Has the patient had a mammogram within the past 2 years? NA - based on age or sex      11. For patients aged 21-65: Has the patient had a pap smear?  NA - based on age or sex

## 2022-05-02 NOTE — TELEPHONE ENCOUNTER
Pt goes offsite for labs (Isak Cobb)    Please advise when future labs are in [facility] and I will send them to him via his my chart.      4 month f/u scheduled for 09/06/2022    Thank you

## 2022-05-07 NOTE — PROGRESS NOTES
Subjective:       Chief Complaint  The patient presents for follow up of diabetes, hypertension and high cholesterol. RAMÍREZ Carpenter is a 60 y.o. male seen for follow up of diabetes. Healso has hypertension and hyperlipidemia. Diabetes well controlled, on metformin, and Trulicity 1.5, pt continues to work on losing weight, hypertension well controlled, on  lisinopril 40 mg which is also for his proteinuria, hyperlipidemia well controlled, on Lipitor 40 mg. Patient continues to work on trying to decrease his BMI which at 28 does put him at increased risk of multiple medical issues. Diet and Lifestyle: not attempting to follow a low fat, low cholesterol diet, does not rigorously follow a diabetic diet, exercises sporadically    Home BP Monitoring: is not measured at home. Diabetic Review of Systems - home glucose monitoring: is performed sporadically     Other symptoms and concerns: pt is now off Keppra and has not had any recurrent seizures. Pt is taking vit D 5000 units/day for his vit D deficiency with good control. Pt continue to smoke tobacco and is trying to quit on his own. He had paranoid thoughts on Chantix. Current Outpatient Medications   Medication Sig    albuterol (PROVENTIL HFA, VENTOLIN HFA, PROAIR HFA) 90 mcg/actuation inhaler Take 2 Puffs by inhalation every four (4) hours as needed for Wheezing.  gabapentin (NEURONTIN) 100 mg capsule Take 100 mg by mouth daily. Prescribed By ENT Dr. Jared Benton lisinopriL (PRINIVIL, ZESTRIL) 40 mg tablet Take 1 Tablet by mouth daily.  atorvastatin (LIPITOR) 40 mg tablet Take 1 Tablet by mouth daily.  Trulicity 1.5 WR/8.5 mL sub-q pen inject 0.5 milliliters ( 1 AND 1/2 milligrams ) subcutaneously every 7 days IN THE ABDOMEN THIGHS OR OUTER AREA OF UPPER ARM ROTATE INJECTION SITES    tadalafiL (Cialis) 5 mg tablet Take 1 Tab by mouth daily as needed for Erectile Dysfunction.     glucose blood VI test strips (FreeStyle Lite Strips) strip Check blood sugar daily. Dx E11.29    metFORMIN (GLUCOPHAGE) 850 mg tablet Take 1 Tab by mouth two (2) times a day.  aspirin 81 mg chewable tablet Take 1 Tab by mouth daily.  acetaminophen (TYLENOL) 325 mg tablet Take 325 mg by mouth every four (4) hours as needed for Pain. No current facility-administered medications for this visit.              Review of Systems  Respiratory: negative for dyspnea on exertion  Cardiovascular: negative for chest pain    Objective:     Visit Vitals  /70 (BP 1 Location: Left arm, BP Patient Position: Sitting, BP Cuff Size: Adult)   Pulse 82   Temp 97.4 °F (36.3 °C) (Temporal)   Resp 20   Ht 5' 10\" (1.778 m)   Wt 244 lb (110.7 kg)   SpO2 98%   BMI 35.01 kg/m²        General appearance - alert, well appearing, and in no distress  Chest -clear to auscultation bilaterally  Heart - normal rate, regular rhythm, normal S1, S2, no murmurs, rubs, clicks or gallops  Extremities - peripheral pulses normal, no pedal edema, no clubbing or cyanosis      Labs:     Lab Results   Component Value Date/Time    Hemoglobin A1c 6.6 (H) 04/26/2022 09:40 AM    Hemoglobin A1c 7.1 (H) 01/10/2022 11:39 AM    Hemoglobin A1c 7.2 (H) 08/31/2020 08:11 AM    Glucose 114 (H) 04/26/2022 09:40 AM    Microalbumin/Creat ratio (mg/g creat) 1,590 (H) 01/10/2022 11:39 AM    Microalb/Creat ratio (ug/mg creat.) 1,191 (H) 04/26/2022 09:40 AM    Microalbumin,urine random 221.00 (H) 01/10/2022 11:39 AM    LDL, calculated 85 04/26/2022 09:40 AM    LDL, calculated 53.8 01/10/2022 11:39 AM    Creatinine 0.69 (L) 04/26/2022 09:40 AM      Lab Results   Component Value Date/Time    Cholesterol, total 139 04/26/2022 09:40 AM    HDL Cholesterol 29 (L) 04/26/2022 09:40 AM    LDL, calculated 85 04/26/2022 09:40 AM    LDL, calculated 53.8 01/10/2022 11:39 AM    Triglyceride 138 04/26/2022 09:40 AM    CHOL/HDL Ratio 3.2 01/10/2022 11:39 AM     Lab Results   Component Value Date/Time    ALT (SGPT) 46 (H) 04/26/2022 09:40 AM    Alk. phosphatase 103 04/26/2022 09:40 AM    Bilirubin, total 0.5 04/26/2022 09:40 AM     Lab Results   Component Value Date/Time    GFR est AA >60 01/10/2022 11:39 AM    GFR est non-AA >60 01/10/2022 11:39 AM    Creatinine 0.69 (L) 04/26/2022 09:40 AM    BUN 11 04/26/2022 09:40 AM    Sodium 138 04/26/2022 09:40 AM    Potassium 5.1 04/26/2022 09:40 AM    Chloride 100 04/26/2022 09:40 AM    CO2 22 04/26/2022 09:40 AM      Lab Results   Component Value Date/Time    Glucose 114 (H) 04/26/2022 09:40 AM      Labs:   Lab Results   Component Value Date/Time    Prostate Specific Ag 0.4 01/10/2022 11:39 AM    Prostate Specific Ag 0.4 11/11/2019 07:25 AM    Prostate Specific Ag 0.3 01/17/2018 07:28 AM       Labs:   Lab Results   Component Value Date/Time    Hemoglobin A1c 6.6 (H) 04/26/2022 09:40 AM    Hemoglobin A1c (POC) 9.8 08/30/2017 05:55 PM            Assessment / Plan     Diabetes well controlled on Trulicity 1.5 mg and  Metformin 850 mg BID. Hypertension well controlled on lisinopril 40 mg  Hyperlipidemia well controlled, on Lipitor 40 mg. ICD-10-CM ICD-9-CM    1. Controlled type 2 diabetes mellitus with microalbuminuria, without long-term current use of insulin ([facility])  E11.29 250.40 HEMOGLOBIN A1C W/O EAG    R80.9 791.0    2. Essential hypertension  R43 112.0 METABOLIC PANEL, COMPREHENSIVE   3. High cholesterol  E78.00 272.0 LIPID PANEL   4. Class 2 severe obesity due to excess calories with serious comorbidity and body mass index (BMI) of 35.0 to 35.9 in adult [facility])  E66.01 278.01  patient will continue to work on trying to lose weight by cutting back starches and sugar in his diet    Z68.35 V85.35               . Diabetic issues reviewed with him: diabetic diet discussed in detail, and low cholesterol diet, weight control and daily exercise discussed. Follow-up and Dispositions    · Return in about 4 months (around 9/2/2022) for labs 1 week before.            Reviewed plan of care. Patient has provided input and agrees with goals.

## 2022-07-04 RX ORDER — LISINOPRIL 40 MG/1
TABLET ORAL
Qty: 90 TABLET | Refills: 1 | Status: SHIPPED | OUTPATIENT
Start: 2022-07-04

## 2022-07-11 RX ORDER — METFORMIN HYDROCHLORIDE 850 MG/1
TABLET ORAL
Qty: 180 TABLET | Refills: 3 | Status: SHIPPED | OUTPATIENT
Start: 2022-07-11

## 2022-07-11 RX ORDER — METFORMIN HYDROCHLORIDE 850 MG/1
850 TABLET ORAL 2 TIMES DAILY
Qty: 180 TABLET | Refills: 3 | OUTPATIENT
Start: 2022-07-11

## 2022-07-11 NOTE — TELEPHONE ENCOUNTER
Duplicate      For Pharmacy Admin Tracking Only     CPA in place:    Recommendation Provided To:    Intervention Detail: Discontinued Rx: 1, reason: Duplicate Therapy   Gap Closed?:    Intervention Accepted By:   [facility] Time Spent (min): 5

## 2022-10-04 LAB
ALBUMIN SERPL-MCNC: 4.2 G/DL (ref 3.8–4.9)
ALBUMIN/GLOB SERPL: 1.8 {RATIO} (ref 1.2–2.2)
ALP SERPL-CCNC: 99 IU/L (ref 44–121)
ALT SERPL-CCNC: 54 IU/L (ref 0–44)
AST SERPL-CCNC: 29 IU/L (ref 0–40)
BILIRUB SERPL-MCNC: 0.4 MG/DL (ref 0–1.2)
BUN SERPL-MCNC: 11 MG/DL (ref 6–24)
BUN/CREAT SERPL: 13 (ref 9–20)
CALCIUM SERPL-MCNC: 9.5 MG/DL (ref 8.7–10.2)
CHLORIDE SERPL-SCNC: 98 MMOL/L (ref 96–106)
CHOLEST SERPL-MCNC: 109 MG/DL (ref 100–199)
CO2 SERPL-SCNC: 22 MMOL/L (ref 20–29)
CREAT SERPL-MCNC: 0.85 MG/DL (ref 0.76–1.27)
EGFR: 100 ML/MIN/1.73
GLOBULIN SER CALC-MCNC: 2.3 G/DL (ref 1.5–4.5)
GLUCOSE SERPL-MCNC: 91 MG/DL (ref 70–99)
HBA1C MFR BLD: 6.2 % (ref 4.8–5.6)
HDLC SERPL-MCNC: 30 MG/DL
IMP & REVIEW OF LAB RESULTS: NORMAL
LDLC SERPL CALC-MCNC: 56 MG/DL (ref 0–99)
POTASSIUM SERPL-SCNC: 4.9 MMOL/L (ref 3.5–5.2)
PROT SERPL-MCNC: 6.5 G/DL (ref 6–8.5)
SODIUM SERPL-SCNC: 136 MMOL/L (ref 134–144)
TRIGL SERPL-MCNC: 126 MG/DL (ref 0–149)
VLDLC SERPL CALC-MCNC: 23 MG/DL (ref 5–40)

## 2022-10-10 ENCOUNTER — OFFICE VISIT (OUTPATIENT)
Dept: INTERNAL MEDICINE CLINIC | Age: 57
End: 2022-10-10
Payer: COMMERCIAL

## 2022-10-10 VITALS
DIASTOLIC BLOOD PRESSURE: 75 MMHG | SYSTOLIC BLOOD PRESSURE: 136 MMHG | HEIGHT: 70 IN | OXYGEN SATURATION: 99 % | HEART RATE: 96 BPM | BODY MASS INDEX: 34.22 KG/M2 | RESPIRATION RATE: 20 BRPM | TEMPERATURE: 98.1 F | WEIGHT: 239 LBS

## 2022-10-10 DIAGNOSIS — E11.29 CONTROLLED TYPE 2 DIABETES MELLITUS WITH MICROALBUMINURIA, WITHOUT LONG-TERM CURRENT USE OF INSULIN (HCC): Primary | ICD-10-CM

## 2022-10-10 DIAGNOSIS — E78.00 HIGH CHOLESTEROL: ICD-10-CM

## 2022-10-10 DIAGNOSIS — Z23 ENCOUNTER FOR IMMUNIZATION: ICD-10-CM

## 2022-10-10 DIAGNOSIS — I10 ESSENTIAL HYPERTENSION: ICD-10-CM

## 2022-10-10 DIAGNOSIS — R80.9 CONTROLLED TYPE 2 DIABETES MELLITUS WITH MICROALBUMINURIA, WITHOUT LONG-TERM CURRENT USE OF INSULIN (HCC): Primary | ICD-10-CM

## 2022-10-10 PROCEDURE — 99214 OFFICE O/P EST MOD 30 MIN: CPT | Performed by: INTERNAL MEDICINE

## 2022-10-10 PROCEDURE — 90471 IMMUNIZATION ADMIN: CPT | Performed by: INTERNAL MEDICINE

## 2022-10-10 PROCEDURE — 90686 IIV4 VACC NO PRSV 0.5 ML IM: CPT | Performed by: INTERNAL MEDICINE

## 2022-10-10 PROCEDURE — 3044F HG A1C LEVEL LT 7.0%: CPT | Performed by: INTERNAL MEDICINE

## 2022-10-10 NOTE — PATIENT INSTRUCTIONS
Vaccine Information Statement    Influenza (Flu) Vaccine (Inactivated or Recombinant): What You Need to Know    Many vaccine information statements are available in Azeri and other languages. See www.immunize.org/vis. Hojas de información sobre vacunas están disponibles en español y en muchos otros idiomas. Visite www.immunize.org/vis. 1. Why get vaccinated? Influenza vaccine can prevent influenza (flu). Flu is a contagious disease that spreads around the [facility] every year, usually between October and May. Anyone can get the flu, but it is more dangerous for some people. Infants and young children, people 72 years and older, pregnant people, and people with certain health conditions or a weakened immune system are at greatest risk of flu complications. Pneumonia, bronchitis, sinus infections, and ear infections are examples of flu-related complications. If you have a medical condition, such as heart disease, cancer, or diabetes, flu can make it worse. Flu can cause fever and chills, sore throat, muscle aches, fatigue, cough, headache, and runny or stuffy nose. Some people may have vomiting and diarrhea, though this is more common in children than adults. In an average year, thousands of people in the [facility] die from flu, and many more are hospitalized. Flu vaccine prevents millions of illnesses and flu-related visits to the doctor each year. 2. Influenza vaccines     CDC recommends everyone 6 months and older get vaccinated every flu season. Children 6 months through 6years of age may need 2 doses during a single flu season. Everyone else needs only 1 dose each flu season. It takes about 2 weeks for protection to develop after vaccination. There are many flu viruses, and they are always changing. Each year a new flu vaccine is made to protect against the influenza viruses believed to be likely to cause disease in the upcoming flu season.  Even when the vaccine doesnt exactly match these viruses, it may still provide some protection. Influenza vaccine does not cause flu. Influenza vaccine may be given at the same time as other vaccines. 3. Talk with your health care provider    Tell your vaccination provider if the person getting the vaccine:  Has had an allergic reaction after a previous dose of influenza vaccine, or has any severe, life-threatening allergies   Has ever had Guillain-Barré Syndrome (also called GBS)    In some cases, your health care provider may decide to postpone influenza vaccination until a future visit. Influenza vaccine can be administered at any time during pregnancy. People who are or will be pregnant during influenza season should receive inactivated influenza vaccine. People with minor illnesses, such as a cold, may be vaccinated. People who are moderately or severely ill should usually wait until they recover before getting influenza vaccine. Your health care provider can give you more information. 4. Risks of a vaccine reaction    Soreness, redness, and swelling where the shot is given, fever, muscle aches, and headache can happen after influenza vaccination. There may be a very small increased risk of Guillain-Barré Syndrome (GBS) after inactivated influenza vaccine (the flu shot)Debbie Dave children who get the flu shot along with pneumococcal vaccine (PCV13) and/or DTaP vaccine at the same time might be slightly more likely to have a seizure caused by fever. Tell your health care provider if a child who is getting flu vaccine has ever had a seizure. People sometimes faint after medical procedures, including vaccination. Tell your provider if you feel dizzy or have vision changes or ringing in the ears. As with any medicine, there is a very remote chance of a vaccine causing a severe allergic reaction, other serious injury, or death. 5. What if there is a serious problem?     An allergic reaction could occur after the vaccinated person leaves the clinic. If you see signs of a severe allergic reaction (hives, swelling of the face and throat, difficulty breathing, a fast heartbeat, dizziness, or weakness), call 9-1-1 and get the person to the nearest hospital.    For other signs that concern you, call your health care provider. Adverse reactions should be reported to the Vaccine Adverse Event Reporting System (VAERS). Your health care provider will usually file this report, or you can do it yourself. Visit the VAERS website at www.vaers. [facility].gov or call 7-124.669.7431. VAERS is only for reporting reactions, and VAERS staff members do not give medical advice. 6. The National Vaccine Injury Compensation Program    The [facility] Vaccine Injury Compensation Program (VICP) is a federal program that was created to compensate people who may have been injured by certain vaccines. Claims regarding alleged injury or death due to vaccination have a time limit for filing, which may be as short as two years. Visit the VICP website at www.[facility].gov/vaccinecompensation or call 5-635.160.9458 to learn about the program and about filing a claim. 7. How can I learn more? Ask your health care provider. Call your local or state health department. Visit the website of the Food and Drug Administration (FDA) for vaccine package inserts and additional information at www.fda.gov/vaccines-blood-biologics/vaccines. Contact the Centers for Disease Control and Prevention (CDC): Call 2-640.375.5132 (1-800-CDC-INFO) or  Visit CDCs influenza website at www.cdc.gov/flu. Vaccine Information Statement   Inactivated Influenza Vaccine   8/6/2021  42 JOHNATHAN Pichardo Layer 507NP-20   Department of Health and Human Services  Centers for Disease Control and Prevention    Office Use Only

## 2022-10-10 NOTE — PROGRESS NOTES
Patient is in the office today for a 5 month follow up. 1. \"Have you been to the ER, urgent care clinic since your last visit? Hospitalized since your last visit? \" No    2. \"Have you seen or consulted any other health care providers outside of the [address] [place] [address] since your last visit? \" No     3. For patients aged 39-70: Has the patient had a colonoscopy / FIT/ Cologuard? Yes - no Care Gap present      If the patient is female:    4. For patients aged 41-77: Has the patient had a mammogram within the past 2 years? NA - based on age or sex      11. For patients aged 21-65: Has the patient had a pap smear? NA - based on age or sex      Verbal order read back per Dr. Elza Farr flu vaccine. Patient received flu vaccine in right deltoid. Patient was observed and no signs or symptoms of an allergic reaction noted at this time. Patient tolerated well and left without complaints. Patient received flu VIS.

## 2022-10-10 NOTE — PROGRESS NOTES
Subjective:       Chief Complaint  The patient presents for follow up of diabetes, hypertension and high cholesterol. RAMÍREZ Cespedes is a 59 y.o. male seen for follow up of diabetes. Healso has hypertension and hyperlipidemia. Diabetes well controlled, on metformin, and Trulicity 1.5, pt continues to work on losing weight, hypertension well controlled, on  lisinopril 40 mg which is also for his proteinuria, hyperlipidemia well controlled, on Lipitor 40 mg. Patient continues to work on trying to decrease his BMI which at 29 does put him at increased risk of multiple medical issues. He has been diagnosed with Sleep apnea in the past but was unable to tolerate the CPAP. Diet and Lifestyle: not attempting to follow a low fat, low cholesterol diet, does not rigorously follow a diabetic diet, exercises sporadically    Home BP Monitoring: is not measured at home. Diabetic Review of Systems - home glucose monitoring: is performed sporadically     Other symptoms and concerns: pt is now off Keppra and has not had any recurrent seizures. Pt is taking vit D 5000 units/day for his vit D deficiency with good control. Pt continue to smoke tobacco and is trying to quit on his own. He had paranoid thoughts on Chantix. Current Outpatient Medications   Medication Sig    metFORMIN (GLUCOPHAGE) 850 mg tablet take 1 tablet by mouth twice a day    lisinopriL (PRINIVIL, ZESTRIL) 40 mg tablet take 1 tablet by mouth once daily    Trulicity 1.5 BQ/1.5 mL sub-q pen inject 0.5 milliliters ( 1 AND 1/2 milligrams ) subcutaneously every 7 days IN THE ABDOMEN THIGHS OR OUTER AREA OF UPPER ARM ROTATE INJECTION SITES    atorvastatin (LIPITOR) 40 mg tablet take 1 tablet by mouth once daily    albuterol (PROVENTIL HFA, VENTOLIN HFA, PROAIR HFA) 90 mcg/actuation inhaler Take 2 Puffs by inhalation every four (4) hours as needed for Wheezing. gabapentin (NEURONTIN) 100 mg capsule Take 100 mg by mouth daily. Prescribed By ENT Dr. Cathy Reddy    tadalafiL (Cialis) 5 mg tablet Take 1 Tab by mouth daily as needed for Erectile Dysfunction. glucose blood VI test strips (FreeStyle Lite Strips) strip Check blood sugar daily. Dx E11.29    aspirin 81 mg chewable tablet Take 1 Tab by mouth daily. acetaminophen (TYLENOL) 325 mg tablet Take 325 mg by mouth every four (4) hours as needed for Pain. No current facility-administered medications for this visit.              Review of Systems  Respiratory: negative for dyspnea on exertion  Cardiovascular: negative for chest pain    Objective:     Visit Vitals  /75   Pulse 96   Temp 98.1 °F (36.7 °C) (Temporal)   Resp 20   Ht 5' 10\" (1.778 m)   Wt 239 lb (108.4 kg)   SpO2 99%   BMI 34.29 kg/m²        General appearance - alert, well appearing, and in no distress  Chest -clear to auscultation bilaterally  Heart - normal rate, regular rhythm, normal S1, S2, no murmurs, rubs, clicks or gallops  Extremities - peripheral pulses normal, no pedal edema, no clubbing or cyanosis      Labs:     Lab Results   Component Value Date/Time    Hemoglobin A1c 6.2 (H) 10/03/2022 08:43 AM    Hemoglobin A1c 6.6 (H) 04/26/2022 09:40 AM    Hemoglobin A1c 7.1 (H) 01/10/2022 11:39 AM    Glucose 91 10/03/2022 08:43 AM    Microalbumin/Creat ratio (mg/g creat) 1,590 (H) 01/10/2022 11:39 AM    Microalb/Creat ratio (ug/mg creat.) 1,191 (H) 04/26/2022 09:40 AM    Microalbumin,urine random 221.00 (H) 01/10/2022 11:39 AM    LDL, calculated 56 10/03/2022 08:43 AM    LDL, calculated 53.8 01/10/2022 11:39 AM    Creatinine 0.85 10/03/2022 08:43 AM      Lab Results   Component Value Date/Time    Cholesterol, total 109 10/03/2022 08:43 AM    HDL Cholesterol 30 (L) 10/03/2022 08:43 AM    LDL, calculated 56 10/03/2022 08:43 AM    LDL, calculated 53.8 01/10/2022 11:39 AM    Triglyceride 126 10/03/2022 08:43 AM    CHOL/HDL Ratio 3.2 01/10/2022 11:39 AM     Lab Results   Component Value Date/Time    ALT (SGPT) 54 (H) 10/03/2022 08:43 AM    Alk. phosphatase 99 10/03/2022 08:43 AM    Bilirubin, total 0.4 10/03/2022 08:43 AM     Lab Results   Component Value Date/Time    GFR est AA >60 01/10/2022 11:39 AM    GFR est non-AA >60 01/10/2022 11:39 AM    Creatinine 0.85 10/03/2022 08:43 AM    BUN 11 10/03/2022 08:43 AM    Sodium 136 10/03/2022 08:43 AM    Potassium 4.9 10/03/2022 08:43 AM    Chloride 98 10/03/2022 08:43 AM    CO2 22 10/03/2022 08:43 AM      Lab Results   Component Value Date/Time    Glucose 91 10/03/2022 08:43 AM      Labs:   Lab Results   Component Value Date/Time    Prostate Specific Ag 0.4 01/10/2022 11:39 AM    Prostate Specific Ag 0.4 11/11/2019 07:25 AM    Prostate Specific Ag 0.3 01/17/2018 07:28 AM       Labs:   Lab Results   Component Value Date/Time    Hemoglobin A1c 6.2 (H) 10/03/2022 08:43 AM    Hemoglobin A1c (POC) 9.8 08/30/2017 05:55 PM            Assessment / Plan     Diabetes well controlled on Trulicity 1.5 mg and  Metformin 850 mg BID. Hypertension well controlled on lisinopril 40 mg  Hyperlipidemia well controlled, on Lipitor 40 mg. ICD-10-CM ICD-9-CM    1. Controlled type 2 diabetes mellitus with microalbuminuria, without long-term current use of insulin (HCC)  E11.29 250.40 HEMOGLOBIN A1C W/O EAG    R80.9 791.0       2. Essential hypertension  B38 276.6 METABOLIC PANEL, COMPREHENSIVE      3. High cholesterol  E78.00 272.0 LIPID PANEL      4. Encounter for immunization  Z23 V03.89 ADMIN INFLUENZA VIRUS VAC      INFLUENZA, FLUARIX, FLULAVAL, FLUZONE (AGE 6 MO+), AFLURIA(AGE 3Y+) IM, PF, 0.5 ML                 . Diabetic issues reviewed with him: diabetic diet discussed in detail, and low cholesterol diet, weight control and daily exercise discussed. Follow-up and Dispositions    Return in about 6 months (around 4/10/2023) for labs 1 week before. Reviewed plan of care. Patient has provided input and agrees with goals.

## 2022-12-30 RX ORDER — ATORVASTATIN CALCIUM 40 MG/1
TABLET, FILM COATED ORAL
Qty: 90 TABLET | Refills: 1 | Status: SHIPPED | OUTPATIENT
Start: 2022-12-30

## 2023-01-18 RX ORDER — ALBUTEROL SULFATE 90 UG/1
AEROSOL, METERED RESPIRATORY (INHALATION)
Qty: 8.5 G | Refills: 5 | Status: SHIPPED | OUTPATIENT
Start: 2023-01-18

## 2023-01-27 RX ORDER — LISINOPRIL 40 MG/1
TABLET ORAL
Qty: 90 TABLET | Refills: 1 | Status: SHIPPED | OUTPATIENT
Start: 2023-01-27

## 2023-04-16 SDOH — ECONOMIC STABILITY: INCOME INSECURITY: HOW HARD IS IT FOR YOU TO PAY FOR THE VERY BASICS LIKE FOOD, HOUSING, MEDICAL CARE, AND HEATING?: NOT VERY HARD

## 2023-04-16 SDOH — ECONOMIC STABILITY: HOUSING INSECURITY
IN THE LAST 12 MONTHS, WAS THERE A TIME WHEN YOU DID NOT HAVE A STEADY PLACE TO SLEEP OR SLEPT IN A SHELTER (INCLUDING NOW)?: NO

## 2023-04-16 SDOH — ECONOMIC STABILITY: FOOD INSECURITY: WITHIN THE PAST 12 MONTHS, THE FOOD YOU BOUGHT JUST DIDN'T LAST AND YOU DIDN'T HAVE MONEY TO GET MORE.: NEVER TRUE

## 2023-04-16 SDOH — ECONOMIC STABILITY: FOOD INSECURITY: WITHIN THE PAST 12 MONTHS, YOU WORRIED THAT YOUR FOOD WOULD RUN OUT BEFORE YOU GOT MONEY TO BUY MORE.: NEVER TRUE

## 2023-04-16 SDOH — ECONOMIC STABILITY: TRANSPORTATION INSECURITY
IN THE PAST 12 MONTHS, HAS LACK OF TRANSPORTATION KEPT YOU FROM MEETINGS, WORK, OR FROM GETTING THINGS NEEDED FOR DAILY LIVING?: NO

## 2023-04-17 ENCOUNTER — OFFICE VISIT (OUTPATIENT)
Age: 58
End: 2023-04-17

## 2023-04-17 ENCOUNTER — TELEPHONE (OUTPATIENT)
Age: 58
End: 2023-04-17

## 2023-04-17 VITALS
HEART RATE: 91 BPM | SYSTOLIC BLOOD PRESSURE: 137 MMHG | WEIGHT: 247 LBS | DIASTOLIC BLOOD PRESSURE: 77 MMHG | BODY MASS INDEX: 35.36 KG/M2 | RESPIRATION RATE: 20 BRPM | TEMPERATURE: 98.7 F | OXYGEN SATURATION: 97 % | HEIGHT: 70 IN

## 2023-04-17 DIAGNOSIS — R35.1 NOCTURIA: ICD-10-CM

## 2023-04-17 DIAGNOSIS — E11.29 TYPE 2 DIABETES MELLITUS WITH OTHER DIABETIC KIDNEY COMPLICATION (HCC): Primary | ICD-10-CM

## 2023-04-17 DIAGNOSIS — G47.33 OBSTRUCTIVE SLEEP APNEA SYNDROME: ICD-10-CM

## 2023-04-17 DIAGNOSIS — L03.119 CELLULITIS OF LOWER EXTREMITY, UNSPECIFIED LATERALITY: ICD-10-CM

## 2023-04-17 DIAGNOSIS — I10 ESSENTIAL (PRIMARY) HYPERTENSION: ICD-10-CM

## 2023-04-17 DIAGNOSIS — H53.2 DIPLOPIA: ICD-10-CM

## 2023-04-17 DIAGNOSIS — J45.40 MODERATE PERSISTENT ASTHMA WITHOUT COMPLICATION: ICD-10-CM

## 2023-04-17 DIAGNOSIS — E78.00 PURE HYPERCHOLESTEROLEMIA, UNSPECIFIED: ICD-10-CM

## 2023-04-17 DIAGNOSIS — Z12.11 COLON CANCER SCREENING: ICD-10-CM

## 2023-04-17 LAB — HBA1C MFR BLD: 6.4 %

## 2023-04-17 RX ORDER — DOXYCYCLINE HYCLATE 100 MG/1
100 CAPSULE ORAL 2 TIMES DAILY
Qty: 20 CAPSULE | Refills: 0 | Status: SHIPPED | OUTPATIENT
Start: 2023-04-17 | End: 2023-04-27

## 2023-04-17 RX ORDER — ATORVASTATIN CALCIUM 40 MG/1
40 TABLET, FILM COATED ORAL DAILY
Qty: 90 TABLET | Refills: 2 | Status: SHIPPED | OUTPATIENT
Start: 2023-04-17

## 2023-04-17 RX ORDER — FLUTICASONE FUROATE AND VILANTEROL 200; 25 UG/1; UG/1
1 POWDER RESPIRATORY (INHALATION) DAILY
Qty: 3 EACH | Refills: 1 | Status: SHIPPED | OUTPATIENT
Start: 2023-04-17 | End: 2023-04-18 | Stop reason: SDUPTHER

## 2023-04-17 RX ORDER — DULAGLUTIDE 1.5 MG/.5ML
INJECTION, SOLUTION SUBCUTANEOUS
Qty: 12 ADJUSTABLE DOSE PRE-FILLED PEN SYRINGE | Refills: 1 | Status: SHIPPED | OUTPATIENT
Start: 2023-04-17

## 2023-04-17 RX ORDER — ALBUTEROL SULFATE 90 UG/1
2 AEROSOL, METERED RESPIRATORY (INHALATION) EVERY 4 HOURS PRN
Qty: 18 G | Refills: 5 | Status: SHIPPED | OUTPATIENT
Start: 2023-04-17

## 2023-04-17 RX ORDER — GABAPENTIN 100 MG/1
100 CAPSULE ORAL 2 TIMES DAILY
Qty: 180 CAPSULE | Refills: 1 | Status: SHIPPED | OUTPATIENT
Start: 2023-04-17 | End: 2023-10-14

## 2023-04-17 RX ORDER — LISINOPRIL 40 MG/1
40 TABLET ORAL DAILY
Qty: 90 TABLET | Refills: 2 | Status: SHIPPED | OUTPATIENT
Start: 2023-04-17

## 2023-04-17 ASSESSMENT — PATIENT HEALTH QUESTIONNAIRE - PHQ9
SUM OF ALL RESPONSES TO PHQ QUESTIONS 1-9: 0
SUM OF ALL RESPONSES TO PHQ9 QUESTIONS 1 & 2: 0
1. LITTLE INTEREST OR PLEASURE IN DOING THINGS: 0
SUM OF ALL RESPONSES TO PHQ QUESTIONS 1-9: 0
2. FEELING DOWN, DEPRESSED OR HOPELESS: 0

## 2023-04-17 NOTE — TELEPHONE ENCOUNTER
Let pt know since we did his Hba1c can do missed labs with next blood draw in 6 months if ok with him.

## 2023-04-17 NOTE — TELEPHONE ENCOUNTER
Per Carola Byrne at [employer] she is unable add additional lab test.    Please advise if patient needs to be redrawn.

## 2023-04-17 NOTE — PROGRESS NOTES
Mercedes Schmidt presents today for   Chief Complaint   Patient presents with    Diabetes    Hypertension    Cholesterol Problem     6 month follow up        1. \"Have you been to the ER, urgent care clinic since your last visit? Hospitalized since your last visit? \" no    2. \"Have you seen or consulted any other health care providers outside of the [address] [place] [address] since your last visit? \" Yes, Dr. Guzman Police ENT. 3. For patients aged 39-70: Has the patient had a colonoscopy / FIT/ Cologuard? Yes - no Care Gap present      If the patient is female:    4. For patients aged 41-77: Has the patient had a mammogram within the past 2 years? NA - based on age or sex      11. For patients aged 21-65: Has the patient had a pap smear?  NA - based on age or sex

## 2023-04-18 ENCOUNTER — TELEPHONE (OUTPATIENT)
Age: 58
End: 2023-04-18

## 2023-04-18 RX ORDER — FLUTICASONE FUROATE AND VILANTEROL 200; 25 UG/1; UG/1
1 POWDER RESPIRATORY (INHALATION) DAILY
Qty: 3 EACH | Refills: 3 | Status: SHIPPED | OUTPATIENT
Start: 2023-04-18

## 2023-04-18 NOTE — TELEPHONE ENCOUNTER
----- Message from KeymarBriseyda [place] sent at 4/17/2023  8:24 PM EDT -----  Regarding: Breo prescription   Contact: [phone]  The Jared Hartley was denied by Lakhwinder Rivas said that [employer] needs an  Authorization form from Dr Av Wade.

## 2023-05-05 ENCOUNTER — CLINICAL DOCUMENTATION (OUTPATIENT)
Age: 58
End: 2023-05-05

## 2023-05-10 LAB — NONINV COLON CA DNA+OCC BLD SCRN STL QL: NEGATIVE

## 2023-05-11 ENCOUNTER — TELEPHONE (OUTPATIENT)
Age: 58
End: 2023-05-11

## 2023-05-11 NOTE — TELEPHONE ENCOUNTER
Left message for patient to call the office, also sent a my chart message.      RE:cologuard was normal

## 2023-05-11 NOTE — TELEPHONE ENCOUNTER
----- Message from Moy Rm MD sent at 5/10/2023 11:06 AM EDT -----  Tell patient his/her cologuard was normal

## 2023-06-26 ENCOUNTER — OFFICE VISIT (OUTPATIENT)
Age: 58
End: 2023-06-26
Payer: COMMERCIAL

## 2023-06-26 VITALS
HEIGHT: 70 IN | BODY MASS INDEX: 36.71 KG/M2 | SYSTOLIC BLOOD PRESSURE: 174 MMHG | HEART RATE: 110 BPM | RESPIRATION RATE: 20 BRPM | DIASTOLIC BLOOD PRESSURE: 72 MMHG | WEIGHT: 256.4 LBS | OXYGEN SATURATION: 97 % | TEMPERATURE: 98.4 F

## 2023-06-26 DIAGNOSIS — Z86.69 HISTORY OF OBSTRUCTIVE SLEEP APNEA: ICD-10-CM

## 2023-06-26 DIAGNOSIS — R29.818 SUSPECTED SLEEP APNEA: ICD-10-CM

## 2023-06-26 DIAGNOSIS — I10 HYPERTENSION, UNSPECIFIED TYPE: ICD-10-CM

## 2023-06-26 DIAGNOSIS — G47.19 EXCESSIVE DAYTIME SLEEPINESS: ICD-10-CM

## 2023-06-26 DIAGNOSIS — R06.83 SNORING: Primary | ICD-10-CM

## 2023-06-26 DIAGNOSIS — F15.10 EXCESSIVE CAFFEINE ABUSE, CONTINUOUS (HCC): ICD-10-CM

## 2023-06-26 DIAGNOSIS — F17.219 CIGARETTE NICOTINE DEPENDENCE WITH NICOTINE-INDUCED DISORDER: ICD-10-CM

## 2023-06-26 DIAGNOSIS — R00.0 TACHYCARDIA: ICD-10-CM

## 2023-06-26 PROCEDURE — 3077F SYST BP >= 140 MM HG: CPT | Performed by: INTERNAL MEDICINE

## 2023-06-26 PROCEDURE — 3078F DIAST BP <80 MM HG: CPT | Performed by: INTERNAL MEDICINE

## 2023-06-26 PROCEDURE — 99204 OFFICE O/P NEW MOD 45 MIN: CPT | Performed by: INTERNAL MEDICINE

## 2023-06-26 ASSESSMENT — PATIENT HEALTH QUESTIONNAIRE - PHQ9
SUM OF ALL RESPONSES TO PHQ9 QUESTIONS 1 & 2: 0
SUM OF ALL RESPONSES TO PHQ QUESTIONS 1-9: 0
SUM OF ALL RESPONSES TO PHQ QUESTIONS 1-9: 0
1. LITTLE INTEREST OR PLEASURE IN DOING THINGS: 0
SUM OF ALL RESPONSES TO PHQ QUESTIONS 1-9: 0
2. FEELING DOWN, DEPRESSED OR HOPELESS: 0
SUM OF ALL RESPONSES TO PHQ QUESTIONS 1-9: 0

## 2023-06-26 ASSESSMENT — SLEEP AND FATIGUE QUESTIONNAIRES
HOW LIKELY ARE YOU TO NOD OFF OR FALL ASLEEP IN A CAR, WHILE STOPPED FOR A FEW MINUTES IN TRAFFIC: 0
HOW LIKELY ARE YOU TO NOD OFF OR FALL ASLEEP WHEN YOU ARE A PASSENGER IN A CAR FOR AN HOUR WITHOUT A BREAK: 0
NECK CIRCUMFERENCE (INCHES): 19
HOW LIKELY ARE YOU TO NOD OFF OR FALL ASLEEP WHILE LYING DOWN TO REST IN THE AFTERNOON WHEN CIRCUMSTANCES PERMIT: 3
HOW LIKELY ARE YOU TO NOD OFF OR FALL ASLEEP WHILE SITTING QUIETLY AFTER LUNCH WITHOUT ALCOHOL: 1
ESS TOTAL SCORE: 8
HOW LIKELY ARE YOU TO NOD OFF OR FALL ASLEEP WHILE SITTING AND TALKING TO SOMEONE: 0
HOW LIKELY ARE YOU TO NOD OFF OR FALL ASLEEP WHILE SITTING AND READING: 2
HOW LIKELY ARE YOU TO NOD OFF OR FALL ASLEEP WHILE SITTING INACTIVE IN A PUBLIC PLACE: 1
HOW LIKELY ARE YOU TO NOD OFF OR FALL ASLEEP WHILE WATCHING TV: 1

## 2023-09-19 RX ORDER — ALBUTEROL SULFATE 90 UG/1
2 AEROSOL, METERED RESPIRATORY (INHALATION) EVERY 4 HOURS PRN
Qty: 18 G | Refills: 5 | Status: SHIPPED | OUTPATIENT
Start: 2023-09-19

## 2023-09-19 RX ORDER — ALBUTEROL SULFATE 90 UG/1
AEROSOL, METERED RESPIRATORY (INHALATION)
Qty: 8.5 G | OUTPATIENT
Start: 2023-09-19

## 2023-09-19 RX ORDER — ALBUTEROL SULFATE 90 UG/1
2 AEROSOL, METERED RESPIRATORY (INHALATION) EVERY 4 HOURS PRN
Qty: 18 G | Refills: 5 | OUTPATIENT
Start: 2023-09-19

## 2023-11-23 LAB
ALBUMIN SERPL-MCNC: 4.4 G/DL (ref 3.8–4.9)
ALBUMIN/GLOB SERPL: 1.8 {RATIO} (ref 1.2–2.2)
ALP SERPL-CCNC: 84 IU/L (ref 44–121)
ALT SERPL-CCNC: 47 IU/L (ref 0–44)
AST SERPL-CCNC: 32 IU/L (ref 0–40)
BILIRUB SERPL-MCNC: 0.4 MG/DL (ref 0–1.2)
BUN SERPL-MCNC: 12 MG/DL (ref 8–27)
BUN/CREAT SERPL: 12 (ref 10–24)
CALCIUM SERPL-MCNC: 9.9 MG/DL (ref 8.6–10.2)
CHLORIDE SERPL-SCNC: 100 MMOL/L (ref 96–106)
CHOLEST SERPL-MCNC: 137 MG/DL (ref 100–199)
CO2 SERPL-SCNC: 24 MMOL/L (ref 20–29)
CREAT SERPL-MCNC: 0.98 MG/DL (ref 0.76–1.27)
EGFRCR SERPLBLD CKD-EPI 2021: 88 ML/MIN/1.73
GLOBULIN SER CALC-MCNC: 2.5 G/DL (ref 1.5–4.5)
GLUCOSE SERPL-MCNC: 122 MG/DL (ref 70–99)
HBA1C MFR BLD: 7.8 % (ref 4.8–5.6)
HDLC SERPL-MCNC: 33 MG/DL
LDLC SERPL CALC-MCNC: 79 MG/DL (ref 0–99)
POTASSIUM SERPL-SCNC: 6.3 MMOL/L (ref 3.5–5.2)
PROT SERPL-MCNC: 6.9 G/DL (ref 6–8.5)
SODIUM SERPL-SCNC: 140 MMOL/L (ref 134–144)
SPECIMEN STATUS REPORT: NORMAL
TRIGL SERPL-MCNC: 144 MG/DL (ref 0–149)
VLDLC SERPL CALC-MCNC: 25 MG/DL (ref 5–40)

## 2023-11-24 LAB
ALBUMIN/CREAT UR: 1385 MG/G CREAT (ref 0–29)
CREAT UR-MCNC: 272.6 MG/DL
MICROALBUMIN UR-MCNC: 3776.5 UG/ML

## 2023-11-27 ENCOUNTER — OFFICE VISIT (OUTPATIENT)
Age: 58
End: 2023-11-27
Payer: COMMERCIAL

## 2023-11-27 VITALS
RESPIRATION RATE: 20 BRPM | HEIGHT: 70 IN | DIASTOLIC BLOOD PRESSURE: 80 MMHG | HEART RATE: 92 BPM | BODY MASS INDEX: 37.08 KG/M2 | WEIGHT: 259 LBS | SYSTOLIC BLOOD PRESSURE: 150 MMHG | OXYGEN SATURATION: 97 % | TEMPERATURE: 99.1 F

## 2023-11-27 DIAGNOSIS — E78.00 PURE HYPERCHOLESTEROLEMIA, UNSPECIFIED: ICD-10-CM

## 2023-11-27 DIAGNOSIS — E11.29 TYPE 2 DIABETES MELLITUS WITH MICROALBUMINURIA, WITHOUT LONG-TERM CURRENT USE OF INSULIN (HCC): Primary | ICD-10-CM

## 2023-11-27 DIAGNOSIS — I10 ESSENTIAL (PRIMARY) HYPERTENSION: ICD-10-CM

## 2023-11-27 DIAGNOSIS — Z12.5 PROSTATE CANCER SCREENING: ICD-10-CM

## 2023-11-27 DIAGNOSIS — R80.9 TYPE 2 DIABETES MELLITUS WITH MICROALBUMINURIA, WITHOUT LONG-TERM CURRENT USE OF INSULIN (HCC): Primary | ICD-10-CM

## 2023-11-27 DIAGNOSIS — E29.1 HYPOGONADISM MALE: ICD-10-CM

## 2023-11-27 DIAGNOSIS — J45.40 MODERATE PERSISTENT ASTHMA WITHOUT COMPLICATION: ICD-10-CM

## 2023-11-27 PROCEDURE — 99214 OFFICE O/P EST MOD 30 MIN: CPT | Performed by: INTERNAL MEDICINE

## 2023-11-27 PROCEDURE — 3051F HG A1C>EQUAL 7.0%<8.0%: CPT | Performed by: INTERNAL MEDICINE

## 2023-11-27 PROCEDURE — 3077F SYST BP >= 140 MM HG: CPT | Performed by: INTERNAL MEDICINE

## 2023-11-27 PROCEDURE — 3079F DIAST BP 80-89 MM HG: CPT | Performed by: INTERNAL MEDICINE

## 2023-11-27 RX ORDER — ATORVASTATIN CALCIUM 40 MG/1
40 TABLET, FILM COATED ORAL DAILY
Qty: 90 TABLET | Refills: 2 | Status: SHIPPED | OUTPATIENT
Start: 2023-11-27

## 2023-11-27 RX ORDER — DULAGLUTIDE 1.5 MG/.5ML
INJECTION, SOLUTION SUBCUTANEOUS
Qty: 12 ADJUSTABLE DOSE PRE-FILLED PEN SYRINGE | Refills: 5 | Status: SHIPPED | OUTPATIENT
Start: 2023-11-27

## 2023-11-27 RX ORDER — FLUTICASONE FUROATE AND VILANTEROL 200; 25 UG/1; UG/1
1 POWDER RESPIRATORY (INHALATION) DAILY
Qty: 1 EACH | Refills: 2 | Status: SHIPPED | OUTPATIENT
Start: 2023-11-27

## 2023-11-27 RX ORDER — ALBUTEROL SULFATE 90 UG/1
2 AEROSOL, METERED RESPIRATORY (INHALATION) EVERY 4 HOURS PRN
Qty: 18 G | Refills: 5 | Status: SHIPPED | OUTPATIENT
Start: 2023-11-27

## 2023-11-27 RX ORDER — HYDROCHLOROTHIAZIDE 25 MG/1
25 TABLET ORAL EVERY MORNING
Qty: 90 TABLET | Refills: 3 | Status: SHIPPED | OUTPATIENT
Start: 2023-11-27

## 2023-11-27 RX ORDER — LISINOPRIL 40 MG/1
40 TABLET ORAL DAILY
Qty: 90 TABLET | Refills: 2 | Status: SHIPPED | OUTPATIENT
Start: 2023-11-27

## 2023-12-13 ENCOUNTER — TELEPHONE (OUTPATIENT)
Age: 58
End: 2023-12-13

## 2023-12-13 DIAGNOSIS — G47.33 OBSTRUCTIVE SLEEP APNEA SYNDROME: Primary | ICD-10-CM

## 2023-12-13 NOTE — TELEPHONE ENCOUNTER
Pt called and would like to know the results of his sleep study. He hasn't heard anything back and nothing is showing up on Kapturet.      Please advise

## 2023-12-13 NOTE — TELEPHONE ENCOUNTER
7/12/2023 Home Sleep study done by Dr Jeffry Loyola shows he has sleep apnea and needs in Lab sleep study to see what device he needs.  Since she left will refer to Dr Quinton Victoria for follow up

## 2024-01-22 DIAGNOSIS — H53.2 DIPLOPIA: ICD-10-CM

## 2024-01-22 RX ORDER — DULAGLUTIDE 1.5 MG/.5ML
INJECTION, SOLUTION SUBCUTANEOUS
Qty: 12 ADJUSTABLE DOSE PRE-FILLED PEN SYRINGE | Refills: 5 | Status: SHIPPED | OUTPATIENT
Start: 2024-01-22

## 2024-01-22 RX ORDER — ATORVASTATIN CALCIUM 40 MG/1
40 TABLET, FILM COATED ORAL DAILY
Qty: 90 TABLET | Refills: 2 | Status: SHIPPED | OUTPATIENT
Start: 2024-01-22

## 2024-01-22 RX ORDER — LISINOPRIL 40 MG/1
40 TABLET ORAL DAILY
Qty: 90 TABLET | Refills: 2 | Status: SHIPPED | OUTPATIENT
Start: 2024-01-22

## 2024-01-22 RX ORDER — GABAPENTIN 100 MG/1
100 CAPSULE ORAL 2 TIMES DAILY
Qty: 180 CAPSULE | Refills: 1 | Status: SHIPPED | OUTPATIENT
Start: 2024-01-22 | End: 2024-07-20

## 2024-01-22 RX ORDER — FLUTICASONE FUROATE AND VILANTEROL 200; 25 UG/1; UG/1
1 POWDER RESPIRATORY (INHALATION) DAILY
Qty: 1 EACH | Refills: 2 | Status: SHIPPED | OUTPATIENT
Start: 2024-01-22

## 2024-01-22 RX ORDER — ALBUTEROL SULFATE 90 UG/1
2 AEROSOL, METERED RESPIRATORY (INHALATION) EVERY 4 HOURS PRN
Qty: 18 G | Refills: 5 | Status: SHIPPED | OUTPATIENT
Start: 2024-01-22

## 2024-01-22 RX ORDER — HYDROCHLOROTHIAZIDE 25 MG/1
25 TABLET ORAL EVERY MORNING
Qty: 90 TABLET | Refills: 3 | Status: SHIPPED | OUTPATIENT
Start: 2024-01-22

## 2024-01-24 DIAGNOSIS — H53.2 DIPLOPIA: ICD-10-CM

## 2024-01-25 ENCOUNTER — TELEPHONE (OUTPATIENT)
Age: 59
End: 2024-01-25

## 2024-01-25 RX ORDER — GABAPENTIN 100 MG/1
CAPSULE ORAL
Qty: 180 CAPSULE | OUTPATIENT
Start: 2024-01-25

## 2024-02-13 ENCOUNTER — TELEPHONE (OUTPATIENT)
Age: 59
End: 2024-02-13

## 2024-02-13 RX ORDER — ALBUTEROL SULFATE 90 UG/1
2 AEROSOL, METERED RESPIRATORY (INHALATION) EVERY 4 HOURS PRN
Qty: 3 EACH | Refills: 3 | Status: SHIPPED | OUTPATIENT
Start: 2024-02-13

## 2024-02-13 NOTE — TELEPHONE ENCOUNTER
Refill req albuterol sulfate HFA (PROVENTIL;VENTOLIN;PROAIR) 108 (90 Base) MCG/ACT inhaler     Pharmacy [facility] - [address] [phone] - F [phone] [[id number]]     [facility] will not fill scripts due to insurance    Patient would [facility]

## 2024-03-27 DIAGNOSIS — H53.2 DIPLOPIA: ICD-10-CM

## 2024-03-27 RX ORDER — ATORVASTATIN CALCIUM 40 MG/1
40 TABLET, FILM COATED ORAL DAILY
Qty: 90 TABLET | Refills: 2 | Status: SHIPPED | OUTPATIENT
Start: 2024-03-27

## 2024-03-27 RX ORDER — FLUTICASONE FUROATE AND VILANTEROL 200; 25 UG/1; UG/1
1 POWDER RESPIRATORY (INHALATION) DAILY
Qty: 1 EACH | Refills: 2 | Status: SHIPPED | OUTPATIENT
Start: 2024-03-27

## 2024-03-27 RX ORDER — LISINOPRIL 40 MG/1
40 TABLET ORAL DAILY
Qty: 90 TABLET | Refills: 2 | Status: SHIPPED | OUTPATIENT
Start: 2024-03-27

## 2024-03-27 RX ORDER — DULAGLUTIDE 1.5 MG/.5ML
INJECTION, SOLUTION SUBCUTANEOUS
Qty: 12 ADJUSTABLE DOSE PRE-FILLED PEN SYRINGE | Refills: 5 | Status: SHIPPED | OUTPATIENT
Start: 2024-03-27

## 2024-03-27 RX ORDER — GABAPENTIN 100 MG/1
100 CAPSULE ORAL 2 TIMES DAILY
Qty: 180 CAPSULE | Refills: 1 | Status: SHIPPED | OUTPATIENT
Start: 2024-03-27 | End: 2024-09-23

## 2024-03-30 LAB
ALBUMIN SERPL-MCNC: 4.1 G/DL (ref 3.8–4.9)
ALBUMIN/GLOB SERPL: 1.6 {RATIO} (ref 1.2–2.2)
ALP SERPL-CCNC: 90 IU/L (ref 44–121)
ALT SERPL-CCNC: 37 IU/L (ref 0–44)
AST SERPL-CCNC: 20 IU/L (ref 0–40)
BASOPHILS # BLD AUTO: 0.1 X10E3/UL (ref 0–0.2)
BASOPHILS NFR BLD AUTO: 1 %
BILIRUB SERPL-MCNC: 0.4 MG/DL (ref 0–1.2)
BUN SERPL-MCNC: 15 MG/DL (ref 8–27)
BUN/CREAT SERPL: 18 (ref 10–24)
CALCIUM SERPL-MCNC: 9.8 MG/DL (ref 8.6–10.2)
CHLORIDE SERPL-SCNC: 100 MMOL/L (ref 96–106)
CHOLEST SERPL-MCNC: 116 MG/DL (ref 100–199)
CO2 SERPL-SCNC: 23 MMOL/L (ref 20–29)
CREAT SERPL-MCNC: 0.85 MG/DL (ref 0.76–1.27)
EGFRCR SERPLBLD CKD-EPI 2021: 99 ML/MIN/1.73
EOSINOPHIL # BLD AUTO: 0.2 X10E3/UL (ref 0–0.4)
EOSINOPHIL NFR BLD AUTO: 1 %
ERYTHROCYTE [DISTWIDTH] IN BLOOD BY AUTOMATED COUNT: 13.3 % (ref 11.6–15.4)
GLOBULIN SER CALC-MCNC: 2.6 G/DL (ref 1.5–4.5)
GLUCOSE SERPL-MCNC: 102 MG/DL (ref 70–99)
HBA1C MFR BLD: 6.4 % (ref 4.8–5.6)
HCT VFR BLD AUTO: 56.6 % (ref 37.5–51)
HDLC SERPL-MCNC: 30 MG/DL
HGB BLD-MCNC: 18.1 G/DL (ref 13–17.7)
IMM GRANULOCYTES # BLD AUTO: 0 X10E3/UL (ref 0–0.1)
IMM GRANULOCYTES NFR BLD AUTO: 0 %
LDLC SERPL CALC-MCNC: 64 MG/DL (ref 0–99)
LDLC/HDLC SERPL: 2.1 RATIO (ref 0–3.6)
LYMPHOCYTES # BLD AUTO: 2.9 X10E3/UL (ref 0.7–3.1)
LYMPHOCYTES NFR BLD AUTO: 22 %
MCH RBC QN AUTO: 30 PG (ref 26.6–33)
MCHC RBC AUTO-ENTMCNC: 32 G/DL (ref 31.5–35.7)
MCV RBC AUTO: 94 FL (ref 79–97)
MONOCYTES # BLD AUTO: 0.8 X10E3/UL (ref 0.1–0.9)
MONOCYTES NFR BLD AUTO: 7 %
NEUTROPHILS # BLD AUTO: 8.8 X10E3/UL (ref 1.4–7)
NEUTROPHILS NFR BLD AUTO: 69 %
PLATELET # BLD AUTO: 384 X10E3/UL (ref 150–450)
POTASSIUM SERPL-SCNC: 5.8 MMOL/L (ref 3.5–5.2)
PROT SERPL-MCNC: 6.7 G/DL (ref 6–8.5)
PSA SERPL-MCNC: 0.5 NG/ML (ref 0–4)
RBC # BLD AUTO: 6.03 X10E6/UL (ref 4.14–5.8)
SODIUM SERPL-SCNC: 137 MMOL/L (ref 134–144)
TRIGL SERPL-MCNC: 124 MG/DL (ref 0–149)
VLDLC SERPL CALC-MCNC: 22 MG/DL (ref 5–40)
WBC # BLD AUTO: 12.8 X10E3/UL (ref 3.4–10.8)

## 2024-04-01 LAB
ALBUMIN/CREAT UR: 867 MG/G CREAT (ref 0–29)
CREAT UR-MCNC: 60.4 MG/DL
MICROALBUMIN UR-MCNC: 523.6 UG/ML
TESTOST FREE SERPL-MCNC: 4.1 PG/ML (ref 6.6–18.1)
TESTOST SERPL-MCNC: 337 NG/DL (ref 264–916)

## 2024-04-29 RX ORDER — LISINOPRIL 40 MG/1
40 TABLET ORAL DAILY
Qty: 90 TABLET | Refills: 3 | Status: SHIPPED | OUTPATIENT
Start: 2024-04-29

## 2024-04-29 RX ORDER — FLUTICASONE FUROATE AND VILANTEROL 200; 25 UG/1; UG/1
1 POWDER RESPIRATORY (INHALATION) DAILY
Qty: 3 EACH | Refills: 3 | Status: SHIPPED | OUTPATIENT
Start: 2024-04-29

## 2024-04-29 RX ORDER — ATORVASTATIN CALCIUM 40 MG/1
40 TABLET, FILM COATED ORAL DAILY
Qty: 90 TABLET | Refills: 3 | Status: SHIPPED | OUTPATIENT
Start: 2024-04-29

## 2024-08-06 LAB
BASOPHILS # BLD AUTO: 0.1 X10E3/UL (ref 0–0.2)
BASOPHILS NFR BLD AUTO: 1 %
EOSINOPHIL # BLD AUTO: 0.1 X10E3/UL (ref 0–0.4)
ERYTHROCYTE [DISTWIDTH] IN BLOOD BY AUTOMATED COUNT: 14.6 % (ref 11.6–15.4)
EST. AVERAGE GLUCOSE BLD GHB EST-MCNC: 128 MG/DL
HBA1C MFR BLD: 6.1 % (ref 4.8–5.6)
HCT VFR BLD AUTO: 53.5 % (ref 37.5–51)
HGB BLD-MCNC: 17.8 G/DL (ref 13–17.7)
IMM GRANULOCYTES # BLD AUTO: 0 X10E3/UL (ref 0–0.1)
IMM GRANULOCYTES NFR BLD AUTO: 0 %
LYMPHOCYTES # BLD AUTO: 3.8 X10E3/UL (ref 0.7–3.1)
LYMPHOCYTES NFR BLD AUTO: 26 %
MCH RBC QN AUTO: 30.6 PG (ref 26.6–33)
MCHC RBC AUTO-ENTMCNC: 33.3 G/DL (ref 31.5–35.7)
MCV RBC AUTO: 92 FL (ref 79–97)
MONOCYTES # BLD AUTO: 0.9 X10E3/UL (ref 0.1–0.9)
MONOCYTES NFR BLD AUTO: 6 %
NEUTROPHILS # BLD AUTO: 9.5 X10E3/UL (ref 1.4–7)
NEUTROPHILS NFR BLD AUTO: 66 %
PLATELET # BLD AUTO: 422 X10E3/UL (ref 150–450)
RBC # BLD AUTO: 5.81 X10E6/UL (ref 4.14–5.8)
SPECIMEN STATUS REPORT: NORMAL
WBC # BLD AUTO: 14.5 X10E3/UL (ref 3.4–10.8)

## 2024-08-07 LAB
ALBUMIN SERPL-MCNC: 4.2 G/DL (ref 3.9–4.9)
ALP SERPL-CCNC: 83 IU/L (ref 44–121)
ALT SERPL-CCNC: 22 IU/L (ref 0–44)
AST SERPL-CCNC: 17 IU/L (ref 0–40)
BILIRUB SERPL-MCNC: 0.3 MG/DL (ref 0–1.2)
BUN SERPL-MCNC: 15 MG/DL (ref 8–27)
BUN/CREAT SERPL: 16 (ref 10–24)
CALCIUM SERPL-MCNC: 10 MG/DL (ref 8.6–10.2)
CHLORIDE SERPL-SCNC: 99 MMOL/L (ref 96–106)
CHOLEST SERPL-MCNC: 120 MG/DL (ref 100–199)
CO2 SERPL-SCNC: 22 MMOL/L (ref 20–29)
CREAT SERPL-MCNC: 0.91 MG/DL (ref 0.76–1.27)
EGFRCR SERPLBLD CKD-EPI 2021: 96 ML/MIN/1.73
GLOBULIN SER CALC-MCNC: 2.5 G/DL (ref 1.5–4.5)
GLUCOSE SERPL-MCNC: 91 MG/DL (ref 70–99)
HDLC SERPL-MCNC: 39 MG/DL
LDLC SERPL CALC-MCNC: 64 MG/DL (ref 0–99)
POTASSIUM SERPL-SCNC: 5.7 MMOL/L (ref 3.5–5.2)
PROT SERPL-MCNC: 6.7 G/DL (ref 6–8.5)
SODIUM SERPL-SCNC: 137 MMOL/L (ref 134–144)
TRIGL SERPL-MCNC: 89 MG/DL (ref 0–149)
VLDLC SERPL CALC-MCNC: 17 MG/DL (ref 5–40)

## 2024-08-11 ENCOUNTER — E-VISIT (OUTPATIENT)
Facility: CLINIC | Age: 59
End: 2024-08-11

## 2024-08-11 DIAGNOSIS — I10 ESSENTIAL (PRIMARY) HYPERTENSION: Primary | ICD-10-CM

## 2024-08-12 SDOH — ECONOMIC STABILITY: FOOD INSECURITY: WITHIN THE PAST 12 MONTHS, YOU WORRIED THAT YOUR FOOD WOULD RUN OUT BEFORE YOU GOT MONEY TO BUY MORE.: NEVER TRUE

## 2024-08-12 SDOH — ECONOMIC STABILITY: FOOD INSECURITY: WITHIN THE PAST 12 MONTHS, THE FOOD YOU BOUGHT JUST DIDN'T LAST AND YOU DIDN'T HAVE MONEY TO GET MORE.: NEVER TRUE

## 2024-08-12 SDOH — ECONOMIC STABILITY: INCOME INSECURITY: HOW HARD IS IT FOR YOU TO PAY FOR THE VERY BASICS LIKE FOOD, HOUSING, MEDICAL CARE, AND HEATING?: NOT VERY HARD

## 2024-08-13 ENCOUNTER — OFFICE VISIT (OUTPATIENT)
Facility: CLINIC | Age: 59
End: 2024-08-13
Payer: COMMERCIAL

## 2024-08-13 VITALS
HEIGHT: 70 IN | OXYGEN SATURATION: 99 % | TEMPERATURE: 97.9 F | SYSTOLIC BLOOD PRESSURE: 106 MMHG | WEIGHT: 218 LBS | HEART RATE: 92 BPM | DIASTOLIC BLOOD PRESSURE: 66 MMHG | BODY MASS INDEX: 31.21 KG/M2 | RESPIRATION RATE: 20 BRPM

## 2024-08-13 DIAGNOSIS — R80.9 TYPE 2 DIABETES MELLITUS WITH MICROALBUMINURIA, WITHOUT LONG-TERM CURRENT USE OF INSULIN (HCC): Primary | ICD-10-CM

## 2024-08-13 DIAGNOSIS — D75.1 POLYCYTHEMIA: ICD-10-CM

## 2024-08-13 DIAGNOSIS — E87.5 HYPERKALEMIA: ICD-10-CM

## 2024-08-13 DIAGNOSIS — E78.00 PURE HYPERCHOLESTEROLEMIA, UNSPECIFIED: ICD-10-CM

## 2024-08-13 DIAGNOSIS — E55.9 VITAMIN D DEFICIENCY: ICD-10-CM

## 2024-08-13 DIAGNOSIS — K42.9 UMBILICAL HERNIA WITHOUT OBSTRUCTION AND WITHOUT GANGRENE: ICD-10-CM

## 2024-08-13 DIAGNOSIS — I10 ESSENTIAL (PRIMARY) HYPERTENSION: ICD-10-CM

## 2024-08-13 DIAGNOSIS — E11.29 TYPE 2 DIABETES MELLITUS WITH MICROALBUMINURIA, WITHOUT LONG-TERM CURRENT USE OF INSULIN (HCC): Primary | ICD-10-CM

## 2024-08-13 PROCEDURE — 99214 OFFICE O/P EST MOD 30 MIN: CPT | Performed by: INTERNAL MEDICINE

## 2024-08-13 PROCEDURE — 3078F DIAST BP <80 MM HG: CPT | Performed by: INTERNAL MEDICINE

## 2024-08-13 PROCEDURE — 3044F HG A1C LEVEL LT 7.0%: CPT | Performed by: INTERNAL MEDICINE

## 2024-08-13 PROCEDURE — 3074F SYST BP LT 130 MM HG: CPT | Performed by: INTERNAL MEDICINE

## 2024-08-13 RX ORDER — CHLORTHALIDONE 25 MG/1
25 TABLET ORAL DAILY
Qty: 90 TABLET | Refills: 3 | Status: SHIPPED | OUTPATIENT
Start: 2024-08-13

## 2024-08-13 NOTE — PROGRESS NOTES
Subjective:       Chief Complaint  The patient presents for follow up of diabetes, hypertension and high cholesterol.        HPI  Cristiano Walker is a 59 y.o.. male seen for follow up of diabetes.   Healso has hypertension and hyperlipidemia. Diabetes well controlled, on metformin and  Trulicity 3mg, pt continues to work on losing weight, he has been trying  keto diet,  hypertension well controlled, on  lisinopril 40 mg and HCTZ 25 mg daily.  Patient has microalbuminuria and may be a candidate for Farxiga in the future.  It has significantly improved with patient losing weight and getting his diabetes and high blood pressure under better control..  Hyperlipidemia  well controlled, on Lipitor 40 mg.  Patient continues to work on trying to decrease his BMI which is down to 33.    Diet and Lifestyle: Patient doing well with keto  diet and work on adding in more exercise.    Home BP Monitoring: is well controlled at home.    Diabetic Review of Systems - home glucose monitoring: is performed sporadically     Other symptoms and concerns: pt is now off Keppra and has not had any recurrent seizures.     Pt is taking vit D 5000 units/day for his vit D deficiency with good control.     Pt continue to smoke tobacco and is trying to quit on his own. He had paranoid thoughts on Chantix.     Patient had issues with double vision which improved on Neurontin which was initially prescribed by his ophthalmologist.  The double vision has currently resolved and is no longer taking Neurontin.    Patient asthma is better controlled on Breo elliptica.  Patient has a lot of mucus production and may benefit from trelegy in the future.    Patient has sleep apnea but is currently not on CPAP but has a follow-up with his sleep specialist in the next few months      Patient has polycythemia for which she is seeing hematology.  He has a follow-up with them in the next day since they need to do further testing.  His potassium remains mildly

## 2024-08-13 NOTE — PROGRESS NOTES
Cristiano Walker presents today for   Chief Complaint   Patient presents with    Cholesterol Problem    Hypertension    Diabetes     4 month follow up            \"Have you been to the ER, urgent care clinic since your last visit?  Hospitalized since your last visit?\"    NO    “Have you seen or consulted any other health care providers outside of [facility] since your last visit?”    Yes, [facility].

## 2024-11-05 LAB
25(OH)D3+25(OH)D2 SERPL-MCNC: 72.1 NG/ML (ref 30–100)
ALBUMIN SERPL-MCNC: 4.3 G/DL (ref 3.9–4.9)
ALBUMIN/CREAT UR: 867 MG/G CREAT (ref 0–29)
ALP SERPL-CCNC: 97 IU/L (ref 44–121)
ALT SERPL-CCNC: 29 IU/L (ref 0–44)
AST SERPL-CCNC: 16 IU/L (ref 0–40)
BASOPHILS # BLD AUTO: 0.1 X10E3/UL (ref 0–0.2)
BASOPHILS NFR BLD AUTO: 1 %
BILIRUB SERPL-MCNC: 0.3 MG/DL (ref 0–1.2)
BUN SERPL-MCNC: 13 MG/DL (ref 8–27)
BUN/CREAT SERPL: 16 (ref 10–24)
CALCIUM SERPL-MCNC: 10 MG/DL (ref 8.6–10.2)
CHLORIDE SERPL-SCNC: 100 MMOL/L (ref 96–106)
CHOLEST SERPL-MCNC: 152 MG/DL (ref 100–199)
CO2 SERPL-SCNC: 22 MMOL/L (ref 20–29)
CREAT SERPL-MCNC: 0.79 MG/DL (ref 0.76–1.27)
CREAT UR-MCNC: 142.1 MG/DL
EGFRCR SERPLBLD CKD-EPI 2021: 101 ML/MIN/1.73
EOSINOPHIL # BLD AUTO: 0.2 X10E3/UL (ref 0–0.4)
EOSINOPHIL NFR BLD AUTO: 1 %
ERYTHROCYTE [DISTWIDTH] IN BLOOD BY AUTOMATED COUNT: 13 % (ref 11.6–15.4)
GLOBULIN SER CALC-MCNC: 2.7 G/DL (ref 1.5–4.5)
GLUCOSE SERPL-MCNC: 86 MG/DL (ref 70–99)
HBA1C MFR BLD: 5.8 % (ref 4.8–5.6)
HCT VFR BLD AUTO: 55 % (ref 37.5–51)
HDLC SERPL-MCNC: 42 MG/DL
HGB BLD-MCNC: 17.4 G/DL (ref 13–17.7)
IMM GRANULOCYTES # BLD AUTO: 0 X10E3/UL (ref 0–0.1)
IMM GRANULOCYTES NFR BLD AUTO: 0 %
LDLC SERPL CALC-MCNC: 93 MG/DL (ref 0–99)
LYMPHOCYTES # BLD AUTO: 3.1 X10E3/UL (ref 0.7–3.1)
LYMPHOCYTES NFR BLD AUTO: 23 %
MCH RBC QN AUTO: 30.1 PG (ref 26.6–33)
MCHC RBC AUTO-ENTMCNC: 31.6 G/DL (ref 31.5–35.7)
MCV RBC AUTO: 95 FL (ref 79–97)
MICROALBUMIN UR-MCNC: 1232.2 UG/ML
MONOCYTES # BLD AUTO: 0.9 X10E3/UL (ref 0.1–0.9)
MONOCYTES NFR BLD AUTO: 7 %
NEUTROPHILS # BLD AUTO: 9.2 X10E3/UL (ref 1.4–7)
NEUTROPHILS NFR BLD AUTO: 68 %
PLATELET # BLD AUTO: 419 X10E3/UL (ref 150–450)
POTASSIUM SERPL-SCNC: 5.3 MMOL/L (ref 3.5–5.2)
PROT SERPL-MCNC: 7 G/DL (ref 6–8.5)
PSA SERPL-MCNC: 0.5 NG/ML (ref 0–4)
RBC # BLD AUTO: 5.78 X10E6/UL (ref 4.14–5.8)
SODIUM SERPL-SCNC: 141 MMOL/L (ref 134–144)
TRIGL SERPL-MCNC: 90 MG/DL (ref 0–149)
VLDLC SERPL CALC-MCNC: 17 MG/DL (ref 5–40)
WBC # BLD AUTO: 13.5 X10E3/UL (ref 3.4–10.8)

## 2024-11-07 LAB
TESTOST FREE SERPL-MCNC: 4.1 PG/ML (ref 6.6–18.1)
TESTOST SERPL-MCNC: 408 NG/DL (ref 264–916)

## 2024-11-13 ENCOUNTER — OFFICE VISIT (OUTPATIENT)
Facility: CLINIC | Age: 59
End: 2024-11-13
Payer: COMMERCIAL

## 2024-11-13 VITALS
HEART RATE: 98 BPM | DIASTOLIC BLOOD PRESSURE: 68 MMHG | BODY MASS INDEX: 31.21 KG/M2 | TEMPERATURE: 98.7 F | WEIGHT: 218 LBS | RESPIRATION RATE: 18 BRPM | HEIGHT: 70 IN | SYSTOLIC BLOOD PRESSURE: 122 MMHG | OXYGEN SATURATION: 98 %

## 2024-11-13 DIAGNOSIS — M79.675 GREAT TOE PAIN, LEFT: ICD-10-CM

## 2024-11-13 DIAGNOSIS — E11.29 TYPE 2 DIABETES MELLITUS WITH MICROALBUMINURIA, WITHOUT LONG-TERM CURRENT USE OF INSULIN (HCC): Primary | ICD-10-CM

## 2024-11-13 DIAGNOSIS — I10 ESSENTIAL (PRIMARY) HYPERTENSION: ICD-10-CM

## 2024-11-13 DIAGNOSIS — D75.1 POLYCYTHEMIA: ICD-10-CM

## 2024-11-13 DIAGNOSIS — R80.9 TYPE 2 DIABETES MELLITUS WITH MICROALBUMINURIA, WITHOUT LONG-TERM CURRENT USE OF INSULIN (HCC): Primary | ICD-10-CM

## 2024-11-13 DIAGNOSIS — E78.00 PURE HYPERCHOLESTEROLEMIA, UNSPECIFIED: ICD-10-CM

## 2024-11-13 PROCEDURE — 3078F DIAST BP <80 MM HG: CPT | Performed by: INTERNAL MEDICINE

## 2024-11-13 PROCEDURE — 3074F SYST BP LT 130 MM HG: CPT | Performed by: INTERNAL MEDICINE

## 2024-11-13 PROCEDURE — 99214 OFFICE O/P EST MOD 30 MIN: CPT | Performed by: INTERNAL MEDICINE

## 2024-11-13 PROCEDURE — 3044F HG A1C LEVEL LT 7.0%: CPT | Performed by: INTERNAL MEDICINE

## 2024-11-13 ASSESSMENT — PATIENT HEALTH QUESTIONNAIRE - PHQ9
SUM OF ALL RESPONSES TO PHQ QUESTIONS 1-9: 0
1. LITTLE INTEREST OR PLEASURE IN DOING THINGS: NOT AT ALL
2. FEELING DOWN, DEPRESSED OR HOPELESS: NOT AT ALL
SUM OF ALL RESPONSES TO PHQ9 QUESTIONS 1 & 2: 0
SUM OF ALL RESPONSES TO PHQ QUESTIONS 1-9: 0

## 2024-11-13 NOTE — PROGRESS NOTES
Cristiano Walker presents today for   Chief Complaint   Patient presents with    Diabetes    Hypertension    Cholesterol Problem           \"Have you been to the ER, urgent care clinic since your last visit?  Hospitalized since your last visit?\"    NO    “Have you seen or consulted any other health care providers outside of [facility] since your last visit?”    NO

## 2024-11-13 NOTE — PROGRESS NOTES
Subjective:       Chief Complaint  The patient presents for follow up of diabetes, hypertension and high cholesterol.        HPI  Cristiano Walker is a 59 y.o.. male seen for follow up of diabetes.   Healso has hypertension and hyperlipidemia. Diabetes well controlled, on metformin and  Trulicity 3mg, pt continues to work on losing weight, he has been trying  keto diet,  hypertension well controlled, on  lisinopril 40 mg. He stopped   chlorthalidone 25 mg daily due to dizziness..  Patient has microalbuminuria and may be a candidate for Farxiga in the future.  It has significantly improved with patient losing weight and getting his diabetes and high blood pressure under better control..  Hyperlipidemia  well controlled, on Lipitor 40 mg.  Patient continues to work on trying to decrease his BMI which is down to 31.    Diet and Lifestyle: Patient doing well with keto  diet and work on adding in more exercise.    Home BP Monitoring: is well controlled at home.    Diabetic Review of Systems - home glucose monitoring: is performed sporadically     Other symptoms and concerns: pt is now off Keppra and has not had any recurrent seizures.     Pt is taking vit D 5000 units/day for his vit D deficiency with good control.     Pt continue to smoke tobacco and is trying to quit on his own. He had paranoid thoughts on Chantix.     Patient had issues with double vision which improved on Neurontin which was initially prescribed by his ophthalmologist.  The double vision has currently resolved and is no longer taking Neurontin.    Patient asthma is better controlled on Breo elliptica.  Patient has a lot of mucus production and may benefit from trelegy in the future.    Patient has sleep apnea and should be on CPAP.      Patient has polycythemia for which he is seeing hematology.      Patient has a rash on both shins which chronic for which he has  seen dermatology.    Patient has a large umbilical hernia for which he currently

## 2025-01-30 ENCOUNTER — TELEPHONE (OUTPATIENT)
Facility: CLINIC | Age: 60
End: 2025-01-30

## 2025-01-30 RX ORDER — ALBUTEROL SULFATE 90 UG/1
2 INHALANT RESPIRATORY (INHALATION) EVERY 4 HOURS PRN
Qty: 3 EACH | Refills: 3 | Status: SHIPPED | OUTPATIENT
Start: 2025-01-30

## 2025-01-30 NOTE — TELEPHONE ENCOUNTER
Refill request via fax     Medication: albuterol sulfate HFA (PROVENTIL;VENTOLIN;PROAIR) 108 (90 Base) MCG/ACT inhaler     Pharmacy: [facility] pharmacy   Last Fill: 02/13/2024    PCP: Kulwant Sparrow MD    LAST OFFICE VISIT: 11/13/2024      Future Appointments   Date Time Provider Department Center   5/27/2025 10:40 AM Kulwant Sparrow MD [facility] DEP

## 2025-03-20 NOTE — TELEPHONE ENCOUNTER
Last OV: 11/13/24   Next OV: 05/27/25   Last RX: 04/29/24      Pharmacy request for 90 day supply-[facility].

## 2025-04-22 ENCOUNTER — PATIENT MESSAGE (OUTPATIENT)
Facility: CLINIC | Age: 60
End: 2025-04-22

## 2025-04-26 NOTE — TELEPHONE ENCOUNTER
John Gonzalez called to the office about a fax received for the patient regarding a sleep study. Patient had a previous study done so the insurance is requiring more info for the pre cert that has made the process longer.      (This message was intended for two patients sleep study authorizations) Stable

## 2025-06-02 RX ORDER — ATORVASTATIN CALCIUM 40 MG/1
40 TABLET, FILM COATED ORAL DAILY
Qty: 90 TABLET | Refills: 3 | Status: SHIPPED | OUTPATIENT
Start: 2025-06-02

## 2025-06-02 RX ORDER — LISINOPRIL 40 MG/1
40 TABLET ORAL DAILY
Qty: 90 TABLET | Refills: 3 | OUTPATIENT
Start: 2025-06-02

## 2025-06-02 RX ORDER — ATORVASTATIN CALCIUM 40 MG/1
40 TABLET, FILM COATED ORAL DAILY
Qty: 90 TABLET | Refills: 3 | OUTPATIENT
Start: 2025-06-02

## 2025-06-02 RX ORDER — LISINOPRIL 40 MG/1
40 TABLET ORAL DAILY
Qty: 90 TABLET | Refills: 3 | Status: SHIPPED | OUTPATIENT
Start: 2025-06-02

## 2025-06-11 ENCOUNTER — HOSPITAL ENCOUNTER (OUTPATIENT)
Facility: HOSPITAL | Age: 60
Setting detail: SPECIMEN
Discharge: HOME | End: 2025-06-14
Payer: OTHER GOVERNMENT

## 2025-06-11 DIAGNOSIS — R80.9 TYPE 2 DIABETES MELLITUS WITH MICROALBUMINURIA, WITHOUT LONG-TERM CURRENT USE OF INSULIN (HCC): ICD-10-CM

## 2025-06-11 DIAGNOSIS — E11.29 TYPE 2 DIABETES MELLITUS WITH MICROALBUMINURIA, WITHOUT LONG-TERM CURRENT USE OF INSULIN (HCC): ICD-10-CM

## 2025-06-11 DIAGNOSIS — E78.00 PURE HYPERCHOLESTEROLEMIA, UNSPECIFIED: ICD-10-CM

## 2025-06-11 DIAGNOSIS — I10 ESSENTIAL (PRIMARY) HYPERTENSION: ICD-10-CM

## 2025-06-11 DIAGNOSIS — D75.1 POLYCYTHEMIA: ICD-10-CM

## 2025-06-11 LAB
ALBUMIN SERPL-MCNC: 3.2 G/DL (ref 3.4–5)
ALBUMIN/GLOB SERPL: 1.1 (ref 0.8–1.7)
ALP SERPL-CCNC: 85 U/L (ref 45–117)
ALT SERPL-CCNC: 28 U/L (ref 10–50)
ANION GAP SERPL CALC-SCNC: 12 MMOL/L (ref 3–18)
AST SERPL-CCNC: 17 U/L (ref 10–38)
BASOPHILS # BLD: 0.13 K/UL (ref 0–0.1)
BASOPHILS NFR BLD: 1 % (ref 0–2)
BILIRUB SERPL-MCNC: 0.4 MG/DL (ref 0.2–1)
BUN SERPL-MCNC: 13 MG/DL (ref 6–23)
BUN/CREAT SERPL: 16 (ref 12–20)
CALCIUM SERPL-MCNC: 9.6 MG/DL (ref 8.5–10.1)
CHLORIDE SERPL-SCNC: 100 MMOL/L (ref 98–107)
CHOLEST SERPL-MCNC: 133 MG/DL
CO2 SERPL-SCNC: 25 MMOL/L (ref 21–32)
CREAT SERPL-MCNC: 0.86 MG/DL (ref 0.6–1.3)
CREAT UR-MCNC: 159 MG/DL (ref 30–125)
DIFFERENTIAL METHOD BLD: ABNORMAL
EOSINOPHIL # BLD: 0.14 K/UL (ref 0–0.4)
EOSINOPHIL NFR BLD: 1.1 % (ref 0–5)
ERYTHROCYTE [DISTWIDTH] IN BLOOD BY AUTOMATED COUNT: 13.8 % (ref 11.6–14.5)
EST. AVERAGE GLUCOSE BLD GHB EST-MCNC: 185 MG/DL
GLOBULIN SER CALC-MCNC: 3 G/DL (ref 2–4)
GLUCOSE SERPL-MCNC: 114 MG/DL (ref 74–108)
HBA1C MFR BLD: 8.1 % (ref 4.2–5.6)
HCT VFR BLD AUTO: 52.8 % (ref 36–48)
HDLC SERPL-MCNC: 31 MG/DL (ref 40–60)
HDLC SERPL: 4.3 (ref 0–5)
HGB BLD-MCNC: 16.7 G/DL (ref 13–16)
IMM GRANULOCYTES # BLD AUTO: 0.07 K/UL (ref 0–0.04)
IMM GRANULOCYTES NFR BLD AUTO: 0.5 % (ref 0–0.5)
LDLC SERPL CALC-MCNC: 81 MG/DL (ref 0–100)
LYMPHOCYTES # BLD: 2.29 K/UL (ref 0.9–3.6)
LYMPHOCYTES NFR BLD: 17.2 % (ref 21–52)
MCH RBC QN AUTO: 29.6 PG (ref 24–34)
MCHC RBC AUTO-ENTMCNC: 31.6 G/DL (ref 31–37)
MCV RBC AUTO: 93.5 FL (ref 78–100)
MICROALBUMIN UR-MCNC: 146 MG/DL (ref 0–3)
MICROALBUMIN/CREAT UR-RTO: 918 MG/G (ref 0–30)
MONOCYTES # BLD: 0.83 K/UL (ref 0.05–1.2)
MONOCYTES NFR BLD: 6.2 % (ref 3–10)
NEUTS SEG # BLD: 9.84 K/UL (ref 1.8–8)
NEUTS SEG NFR BLD: 74 % (ref 40–73)
NRBC # BLD: 0 K/UL (ref 0–0.01)
NRBC BLD-RTO: 0 PER 100 WBC
PLATELET # BLD AUTO: 353 K/UL (ref 135–420)
PMV BLD AUTO: 10.4 FL (ref 9.2–11.8)
POTASSIUM SERPL-SCNC: 5.6 MMOL/L (ref 3.5–5.5)
PROT SERPL-MCNC: 6.2 G/DL (ref 6.4–8.2)
RBC # BLD AUTO: 5.65 M/UL (ref 4.35–5.65)
SODIUM SERPL-SCNC: 137 MMOL/L (ref 136–145)
TRIGL SERPL-MCNC: 107 MG/DL (ref 0–150)
VLDLC SERPL CALC-MCNC: 21 MG/DL
WBC # BLD AUTO: 13.3 K/UL (ref 4.6–13.2)

## 2025-06-11 PROCEDURE — 80053 COMPREHEN METABOLIC PANEL: CPT

## 2025-06-11 PROCEDURE — 82043 UR ALBUMIN QUANTITATIVE: CPT

## 2025-06-11 PROCEDURE — 80061 LIPID PANEL: CPT

## 2025-06-11 PROCEDURE — 85025 COMPLETE CBC W/AUTO DIFF WBC: CPT

## 2025-06-11 PROCEDURE — 36415 COLL VENOUS BLD VENIPUNCTURE: CPT

## 2025-06-11 PROCEDURE — 83036 HEMOGLOBIN GLYCOSYLATED A1C: CPT

## 2025-06-11 PROCEDURE — 82570 ASSAY OF URINE CREATININE: CPT

## 2025-06-15 SDOH — ECONOMIC STABILITY: INCOME INSECURITY: IN THE LAST 12 MONTHS, WAS THERE A TIME WHEN YOU WERE NOT ABLE TO PAY THE MORTGAGE OR RENT ON TIME?: NO

## 2025-06-15 SDOH — ECONOMIC STABILITY: TRANSPORTATION INSECURITY
IN THE PAST 12 MONTHS, HAS THE LACK OF TRANSPORTATION KEPT YOU FROM MEDICAL APPOINTMENTS OR FROM GETTING MEDICATIONS?: NO

## 2025-06-15 SDOH — ECONOMIC STABILITY: FOOD INSECURITY: WITHIN THE PAST 12 MONTHS, THE FOOD YOU BOUGHT JUST DIDN'T LAST AND YOU DIDN'T HAVE MONEY TO GET MORE.: NEVER TRUE

## 2025-06-15 SDOH — ECONOMIC STABILITY: FOOD INSECURITY: WITHIN THE PAST 12 MONTHS, YOU WORRIED THAT YOUR FOOD WOULD RUN OUT BEFORE YOU GOT MONEY TO BUY MORE.: NEVER TRUE

## 2025-06-17 ENCOUNTER — OFFICE VISIT (OUTPATIENT)
Facility: CLINIC | Age: 60
End: 2025-06-17
Payer: OTHER GOVERNMENT

## 2025-06-17 VITALS
BODY MASS INDEX: 34.93 KG/M2 | SYSTOLIC BLOOD PRESSURE: 138 MMHG | HEIGHT: 70 IN | WEIGHT: 244 LBS | OXYGEN SATURATION: 97 % | RESPIRATION RATE: 18 BRPM | DIASTOLIC BLOOD PRESSURE: 81 MMHG | TEMPERATURE: 98.4 F | HEART RATE: 100 BPM

## 2025-06-17 DIAGNOSIS — R80.9 TYPE 2 DIABETES MELLITUS WITH MICROALBUMINURIA, WITHOUT LONG-TERM CURRENT USE OF INSULIN (HCC): Primary | ICD-10-CM

## 2025-06-17 DIAGNOSIS — J43.2 CENTRILOBULAR EMPHYSEMA (HCC): ICD-10-CM

## 2025-06-17 DIAGNOSIS — E78.00 PURE HYPERCHOLESTEROLEMIA, UNSPECIFIED: ICD-10-CM

## 2025-06-17 DIAGNOSIS — E11.29 TYPE 2 DIABETES MELLITUS WITH MICROALBUMINURIA, WITHOUT LONG-TERM CURRENT USE OF INSULIN (HCC): Primary | ICD-10-CM

## 2025-06-17 DIAGNOSIS — R60.0 EDEMA OF LOWER LEG DUE TO PERIPHERAL VENOUS INSUFFICIENCY: ICD-10-CM

## 2025-06-17 DIAGNOSIS — I87.2 EDEMA OF LOWER LEG DUE TO PERIPHERAL VENOUS INSUFFICIENCY: ICD-10-CM

## 2025-06-17 DIAGNOSIS — G47.33 OBSTRUCTIVE SLEEP APNEA SYNDROME: ICD-10-CM

## 2025-06-17 DIAGNOSIS — E66.812 CLASS 2 SEVERE OBESITY DUE TO EXCESS CALORIES WITH SERIOUS COMORBIDITY AND BODY MASS INDEX (BMI) OF 35.0 TO 35.9 IN ADULT (HCC): ICD-10-CM

## 2025-06-17 DIAGNOSIS — E66.01 CLASS 2 SEVERE OBESITY DUE TO EXCESS CALORIES WITH SERIOUS COMORBIDITY AND BODY MASS INDEX (BMI) OF 35.0 TO 35.9 IN ADULT (HCC): ICD-10-CM

## 2025-06-17 DIAGNOSIS — D75.1 POLYCYTHEMIA: ICD-10-CM

## 2025-06-17 DIAGNOSIS — E55.9 VITAMIN D DEFICIENCY: ICD-10-CM

## 2025-06-17 DIAGNOSIS — I10 ESSENTIAL (PRIMARY) HYPERTENSION: ICD-10-CM

## 2025-06-17 PROCEDURE — 3075F SYST BP GE 130 - 139MM HG: CPT | Performed by: INTERNAL MEDICINE

## 2025-06-17 PROCEDURE — 3079F DIAST BP 80-89 MM HG: CPT | Performed by: INTERNAL MEDICINE

## 2025-06-17 PROCEDURE — 99214 OFFICE O/P EST MOD 30 MIN: CPT | Performed by: INTERNAL MEDICINE

## 2025-06-17 PROCEDURE — 3052F HG A1C>EQUAL 8.0%<EQUAL 9.0%: CPT | Performed by: INTERNAL MEDICINE

## 2025-06-17 RX ORDER — SEMAGLUTIDE 0.68 MG/ML
INJECTION, SOLUTION SUBCUTANEOUS
Qty: 3 ML | Refills: 0 | Status: SHIPPED | OUTPATIENT
Start: 2025-06-17 | End: 2025-08-16

## 2025-06-17 RX ORDER — FLUTICASONE FUROATE, UMECLIDINIUM BROMIDE AND VILANTEROL TRIFENATATE 100; 62.5; 25 UG/1; UG/1; UG/1
1 POWDER RESPIRATORY (INHALATION) DAILY
Qty: 3 EACH | Refills: 3 | Status: SHIPPED | OUTPATIENT
Start: 2025-06-17

## 2025-06-17 ASSESSMENT — PATIENT HEALTH QUESTIONNAIRE - PHQ9
2. FEELING DOWN, DEPRESSED OR HOPELESS: NOT AT ALL
1. LITTLE INTEREST OR PLEASURE IN DOING THINGS: NOT AT ALL
SUM OF ALL RESPONSES TO PHQ QUESTIONS 1-9: 0

## 2025-06-17 NOTE — PROGRESS NOTES
Subjective:       Chief Complaint  The patient presents for follow up of diabetes, hypertension and high cholesterol.        HPI  Cristiano Walker is a 59 y.o.. male seen for follow up of diabetes.   Healso has hypertension and hyperlipidemia. Diabetes uncontrolled, on metformin and  off Trulicity 3mg which he cannot get from the pharmacy, pt continues to work on losing weight, he has been trying  keto diet,  hypertension well controlled, on  lisinopril 40 mg. He stopped   chlorthalidone 25 mg daily due to dizziness..  Patient has microalbuminuria and may be a candidate for Farxiga in the future.   Pt has regained weight off the GLP-1 and will try to restart him back .  Hyperlipidemia  well controlled, on Lipitor 40 mg.  Patient continues to work on trying to decrease his BMI which is back up to 35    Diet and Lifestyle: Patient hopefully once he gets back on GLP-1 will be able to try and lose more weight    Home BP Monitoring: is well controlled at home.    Diabetic Review of Systems - home glucose monitoring: is performed sporadically     Other symptoms and concerns: pt is now off Keppra and has not had any recurrent seizures.     Pt is taking vit D 5000 units/day for his vit D deficiency with good control.     Pt continue to smoke tobacco and is trying to quit on his own. He had paranoid thoughts on Chantix.     Patient had issues with double vision which improved on Neurontin which was initially prescribed by his ophthalmologist.  The double vision has currently resolved and is no longer taking Neurontin.    Patient has been diagnosed with COPD and has been having issues with chronic shortness of breath.  He is a good candidate for Trelegy Ellipta and we will also refer him to pulmonary for further management as well as for the pulmonary nodules that were found on recent CAT scan done during ER visit.    Patient has sleep apnea that is severe but he never connected to get on the CPAP machine that he can

## 2025-06-17 NOTE — PROGRESS NOTES
Cristiano Walker presents today for   Chief Complaint   Patient presents with    Diabetes    Hypertension    Cholesterol Problem     6 month follow up      Patient is requesting an alternative for Trulicity because he has not been able to get it at the pharmacy for 2 months.       \"Have you been to the ER, urgent care clinic since your last visit?  Hospitalized since your last visit?\"    NO    “Have you seen or consulted any other health care providers outside of [facility] since your last visit?”    NO

## 2025-09-05 ENCOUNTER — TELEPHONE (OUTPATIENT)
Facility: CLINIC | Age: 60
End: 2025-09-05

## 2025-09-05 ENCOUNTER — PATIENT MESSAGE (OUTPATIENT)
Facility: CLINIC | Age: 60
End: 2025-09-05

## 2025-09-05 DIAGNOSIS — J43.2 CENTRILOBULAR EMPHYSEMA (HCC): Primary | ICD-10-CM

## 2025-09-05 RX ORDER — ALBUTEROL SULFATE 90 UG/1
2 INHALANT RESPIRATORY (INHALATION) EVERY 4 HOURS PRN
Qty: 3 EACH | Refills: 3 | Status: SHIPPED | OUTPATIENT
Start: 2025-09-05

## 2025-09-05 RX ORDER — ALBUTEROL SULFATE 0.83 MG/ML
2.5 SOLUTION RESPIRATORY (INHALATION) EVERY 6 HOURS PRN
Qty: 120 EACH | Refills: 2 | Status: SHIPPED | OUTPATIENT
Start: 2025-09-05